# Patient Record
Sex: FEMALE | Race: WHITE
[De-identification: names, ages, dates, MRNs, and addresses within clinical notes are randomized per-mention and may not be internally consistent; named-entity substitution may affect disease eponyms.]

---

## 2020-07-30 ENCOUNTER — RX ONLY (RX ONLY)
Age: 60
End: 2020-07-30

## 2020-07-30 ENCOUNTER — DOCTOR'S OFFICE (OUTPATIENT)
Dept: URBAN - METROPOLITAN AREA CLINIC 136 | Facility: CLINIC | Age: 60
Setting detail: OPHTHALMOLOGY
End: 2020-07-30
Payer: COMMERCIAL

## 2020-07-30 DIAGNOSIS — H00.15: ICD-10-CM

## 2020-07-30 DIAGNOSIS — E11.9: ICD-10-CM

## 2020-07-30 PROCEDURE — 92002 INTRM OPH EXAM NEW PATIENT: CPT | Performed by: OPHTHALMOLOGY

## 2020-08-06 ASSESSMENT — VISUAL ACUITY
OD_BCVA: 20/30-2
OS_BCVA: 20/30-1

## 2020-12-14 ENCOUNTER — DOCTOR'S OFFICE (OUTPATIENT)
Dept: URBAN - METROPOLITAN AREA CLINIC 136 | Facility: CLINIC | Age: 60
Setting detail: OPHTHALMOLOGY
End: 2020-12-14
Payer: COMMERCIAL

## 2020-12-14 DIAGNOSIS — H52.4: ICD-10-CM

## 2020-12-14 DIAGNOSIS — H52.223: ICD-10-CM

## 2020-12-14 PROBLEM — H00.15 CHALAZION; LEFT LOWER LID: Status: RESOLVED | Noted: 2020-07-30 | Resolved: 2020-12-14

## 2020-12-14 PROBLEM — E11.9 DIABETES TYPE 2 NO RETINOPATHY: Status: ACTIVE | Noted: 2020-07-30

## 2020-12-14 PROCEDURE — 92014 COMPRE OPH EXAM EST PT 1/>: CPT | Performed by: OPTOMETRIST

## 2020-12-14 PROCEDURE — 92015 DETERMINE REFRACTIVE STATE: CPT | Performed by: OPTOMETRIST

## 2020-12-14 ASSESSMENT — REFRACTION_CURRENTRX
OD_AXIS: 105
OD_SPHERE: +1.75
OS_CYLINDER: -1.50
OS_SPHERE: +2.00
OD_OVR_VA: 20/
OS_AXIS: 105
OS_VPRISM_DIRECTION: SV
OS_OVR_VA: 20/
OD_CYLINDER: -1.25
OD_VPRISM_DIRECTION: SV

## 2020-12-14 ASSESSMENT — REFRACTION_MANIFEST
OS_VA1: 20/20
OU_VA: 20/20
OS_SPHERE: +1.75
OS_CYLINDER: -1.25
OS_ADD: +2.25
OD_VA1: 20/20
OD_SPHERE: +1.50
OD_CYLINDER: -1.25
OS_AXIS: 100
OD_ADD: +2.25
OD_AXIS: 105

## 2020-12-14 ASSESSMENT — REFRACTION_AUTOREFRACTION
OD_AXIS: 105
OS_AXIS: 095
OD_SPHERE: +2.00
OD_CYLINDER: -1.25
OS_CYLINDER: -1.25
OS_SPHERE: +2.25

## 2020-12-14 ASSESSMENT — CONFRONTATIONAL VISUAL FIELD TEST (CVF)
OD_FINDINGS: FULL
OS_FINDINGS: FULL

## 2020-12-14 ASSESSMENT — SPHEQUIV_DERIVED
OS_SPHEQUIV: 1.625
OD_SPHEQUIV: 0.875
OD_SPHEQUIV: 1.375
OS_SPHEQUIV: 1.125

## 2020-12-14 ASSESSMENT — VISUAL ACUITY
OD_BCVA: 20/20-1
OS_BCVA: 20/20-2

## 2022-04-12 ENCOUNTER — TELEPHONE (OUTPATIENT)
Dept: PAIN MEDICINE | Facility: MEDICAL CENTER | Age: 62
End: 2022-04-12

## 2022-06-06 ENCOUNTER — APPOINTMENT (OUTPATIENT)
Dept: RADIOLOGY | Facility: MEDICAL CENTER | Age: 62
End: 2022-06-06
Payer: COMMERCIAL

## 2022-06-06 DIAGNOSIS — M54.42 ACUTE BACK PAIN WITH SCIATICA, LEFT: ICD-10-CM

## 2022-06-06 DIAGNOSIS — K62.89 CHRONIC IDIOPATHIC ANAL PAIN: ICD-10-CM

## 2022-06-06 DIAGNOSIS — M54.41 ACUTE BACK PAIN WITH SCIATICA, RIGHT: ICD-10-CM

## 2022-06-06 DIAGNOSIS — G89.29 CHRONIC IDIOPATHIC ANAL PAIN: ICD-10-CM

## 2022-06-06 PROCEDURE — 72114 X-RAY EXAM L-S SPINE BENDING: CPT

## 2022-06-28 ENCOUNTER — HOSPITAL ENCOUNTER (OUTPATIENT)
Dept: MAMMOGRAPHY | Facility: MEDICAL CENTER | Age: 62
Discharge: HOME | End: 2022-06-28
Payer: COMMERCIAL

## 2022-06-28 VITALS — WEIGHT: 200 LBS | HEIGHT: 62 IN | BODY MASS INDEX: 36.8 KG/M2

## 2022-06-28 DIAGNOSIS — Z12.31 ENCOUNTER FOR SCREENING MAMMOGRAM FOR MALIGNANT NEOPLASM OF BREAST: ICD-10-CM

## 2022-06-28 PROCEDURE — 77067 SCR MAMMO BI INCL CAD: CPT

## 2022-06-28 PROCEDURE — 77063 BREAST TOMOSYNTHESIS BI: CPT

## 2022-07-07 ENCOUNTER — APPOINTMENT (OUTPATIENT)
Dept: RADIOLOGY | Facility: MEDICAL CENTER | Age: 62
End: 2022-07-07
Payer: COMMERCIAL

## 2022-07-07 ENCOUNTER — TELEPHONE (OUTPATIENT)
Dept: PAIN MEDICINE | Facility: MEDICAL CENTER | Age: 62
End: 2022-07-07

## 2022-07-07 ENCOUNTER — OFFICE VISIT (OUTPATIENT)
Dept: URGENT CARE | Facility: MEDICAL CENTER | Age: 62
End: 2022-07-07
Payer: COMMERCIAL

## 2022-07-07 VITALS
DIASTOLIC BLOOD PRESSURE: 90 MMHG | HEIGHT: 62 IN | SYSTOLIC BLOOD PRESSURE: 130 MMHG | RESPIRATION RATE: 20 BRPM | TEMPERATURE: 98.6 F | WEIGHT: 200 LBS | BODY MASS INDEX: 36.8 KG/M2 | HEART RATE: 85 BPM | OXYGEN SATURATION: 96 %

## 2022-07-07 DIAGNOSIS — J18.9 PNEUMONIA OF LEFT LOWER LOBE DUE TO INFECTIOUS ORGANISM: Primary | ICD-10-CM

## 2022-07-07 DIAGNOSIS — J20.9 ACUTE BRONCHITIS, UNSPECIFIED ORGANISM: ICD-10-CM

## 2022-07-07 PROCEDURE — 99213 OFFICE O/P EST LOW 20 MIN: CPT | Performed by: PHYSICIAN ASSISTANT

## 2022-07-07 PROCEDURE — 71046 X-RAY EXAM CHEST 2 VIEWS: CPT

## 2022-07-07 RX ORDER — SIMVASTATIN 20 MG
1 TABLET ORAL EVERY EVENING
COMMUNITY
Start: 2022-05-06

## 2022-07-07 RX ORDER — AZITHROMYCIN 250 MG/1
TABLET, FILM COATED ORAL
Qty: 6 TABLET | Refills: 0 | Status: SHIPPED | OUTPATIENT
Start: 2022-07-07 | End: 2022-07-11

## 2022-07-07 RX ORDER — VALACYCLOVIR HYDROCHLORIDE 1 G/1
TABLET, FILM COATED ORAL
COMMUNITY

## 2022-07-07 RX ORDER — LISINOPRIL 5 MG/1
5 TABLET ORAL DAILY
COMMUNITY
Start: 2021-12-29 | End: 2022-12-29

## 2022-07-07 RX ORDER — LORATADINE 10 MG/1
TABLET ORAL
COMMUNITY

## 2022-07-07 RX ORDER — ASPIRIN 81 MG/1
81 TABLET ORAL DAILY
COMMUNITY

## 2022-07-07 RX ORDER — BENZONATATE 200 MG/1
200 CAPSULE ORAL 3 TIMES DAILY PRN
Qty: 20 CAPSULE | Refills: 0 | Status: SHIPPED | OUTPATIENT
Start: 2022-07-07 | End: 2022-07-21

## 2022-07-07 RX ORDER — PREDNISONE 10 MG/1
TABLET ORAL
Qty: 17 TABLET | Refills: 0 | Status: SHIPPED | OUTPATIENT
Start: 2022-07-07

## 2022-07-07 RX ORDER — TRAMADOL HYDROCHLORIDE 50 MG/1
50 TABLET ORAL EVERY 8 HOURS PRN
COMMUNITY
Start: 2022-06-15 | End: 2023-06-15

## 2022-07-07 RX ORDER — GLIMEPIRIDE 2 MG/1
1 TABLET ORAL DAILY
COMMUNITY
Start: 2022-04-29

## 2022-07-07 NOTE — PATIENT INSTRUCTIONS
Prednisone as prescribed  Take 1st in the morning  Monitor glucose closely and limit carb intake  Do not use NSAIDs while taking prednisone  Tylenol  Tessalon Perles as needed for cough  Take antibiotic as prescribed  Complete full dose even symptoms began to improve  Over-the-counter cough suppressant  Drink plenty of fluids  Take deep breaths  Be sure to follow-up with family doctor in 3-5 days  Go to ER if symptoms become severe

## 2022-07-07 NOTE — LETTER
July 7, 2022     Patient: Radha Elizabeth   YOB: 1960   Date of Visit: 7/7/2022       To Whom It May Concern: It is my medical opinion that Radha Elizabeth may return to work on 7/13/2022             Sincerely,        Nohemi Huber PA-C

## 2022-07-07 NOTE — PROGRESS NOTES
[facility]        NAME: Tristan Brady is a 65 y o  female  : 1960    MRN: [id number]  DATE: 2022  TIME: 12:34 PM    Assessment and Plan   Pneumonia of left lower lobe due to infectious organism [J18 9]  1  Pneumonia of left lower lobe due to infectious organism  XR chest pa & lateral    azithromycin (ZITHROMAX) 250 mg tablet    benzonatate (TESSALON) 200 MG capsule    predniSONE 10 mg tablet         Patient Instructions   Prednisone as prescribed  Take 1st in the morning  Monitor glucose closely and limit carb intake  Do not use NSAIDs while taking prednisone  Tylenol  Tessalon Perles as needed for cough  Take antibiotic as prescribed  Complete full dose even symptoms began to improve  Over-the-counter cough suppressant  Drink plenty of fluids  Take deep breaths    Follow up with PCP in 3-5 days  Proceed to  ER if symptoms worsen  Chief Complaint     Chief Complaint   Patient presents with    Cough     Pt C/O of a cough that is making it hard to catch her breath since 22, completed home covid test negative  Pt just finished a round of steroid treatment  Was seen by a physician recently and informed lungs sounded clear  History of Present Illness       Pt is a 65year old female with sig PMHx of DM presents to the office c/o cough x 1 week  Reports SOB only with coughing fits  She denies fever, chills, sore throat, congestion, CP, nausea, vomiting, or abdominal pain  Denies LE swelling or calf pain  She has taken multiple COVID-19 tests, last one today, which were all negative  She took a medrol dose pack with no relief of symptoms  BG have around 168-190  Review of Systems   Review of Systems   Constitutional: Negative for chills and fever  HENT: Negative for congestion, postnasal drip, rhinorrhea and sore throat  Respiratory: Positive for cough and shortness of breath  Cardiovascular: Negative for chest pain and palpitations     Gastrointestinal: Negative for abdominal pain, diarrhea, nausea and vomiting  Musculoskeletal: Negative for myalgias  Neurological: Negative for dizziness, light-headedness and headaches           Current Medications       Current Outpatient Medications:     azithromycin (ZITHROMAX) 250 mg tablet, Take 2 tablets today then 1 tablet daily x 4 days, Disp: 6 tablet, Rfl: 0    benzonatate (TESSALON) 200 MG capsule, Take 1 capsule (200 mg total) by mouth 3 (three) times a day as needed for cough for up to 14 days, Disp: 20 capsule, Rfl: 0    glimepiride (AMARYL) 2 mg tablet, Take 1 tablet by mouth daily, Disp: , Rfl:     lisinopril (ZESTRIL) 5 mg tablet, Take 5 mg by mouth daily, Disp: , Rfl:     predniSONE 10 mg tablet, Take 4 on day 1, take 4 on day 2, take 3 on day 3, take 3 on day 4, take 2 on day 5, take 1 on day 6 , Disp: 17 tablet, Rfl: 0    simvastatin (ZOCOR) 20 mg tablet, Take 1 tablet by mouth every evening, Disp: , Rfl:     traMADol (ULTRAM) 50 mg tablet, Take 50 mg by mouth every 8 (eight) hours as needed, Disp: , Rfl:     aspirin (ECOTRIN LOW STRENGTH) 81 mg EC tablet, Take 81 mg by mouth daily, Disp: , Rfl:     famotidine (PEPCID) 20 mg/2 mL SOLN, , Disp: , Rfl:     loratadine (CLARITIN) 10 mg tablet, , Disp: , Rfl:     valACYclovir (VALTREX) 1,000 mg tablet, , Disp: , Rfl:     Current Allergies     Allergies as of 07/07/2022 - Reviewed 07/07/2022   Allergen Reaction Noted    Acetazolamide Hives 10/12/2020    Red dye - food allergy Other (See Comments) 07/29/2014    Sulfa antibiotics Other (See Comments) 12/04/2001            The following portions of the patient's history were reviewed and updated as appropriate: allergies, current medications, past family history, past medical history, past social history, past surgical history and problem list      Past Medical History:   Diagnosis Date    Chronic back pain     Type 2 diabetes mellitus ([address] )        Past Surgical History:   Procedure Laterality Date    BREAST BIOPSY Left     benign       Family History   Problem Relation Age of Onset    Breast cancer Mother 72         Medications have been verified  Objective   /90   Pulse 85   Temp 98 6 °F (37 °C)   Resp 20   Ht 5' 2" (1 575 m)   Wt 90 7 kg (200 lb)   SpO2 96%   BMI 36 58 kg/m²   No LMP recorded  Patient is postmenopausal        Physical Exam     Physical Exam  Vitals and nursing note reviewed  Constitutional:       Appearance: Normal appearance  She is well-developed  HENT:      Head: Normocephalic and atraumatic  Right Ear: Tympanic membrane, ear canal and external ear normal       Left Ear: Tympanic membrane, ear canal and external ear normal       Nose: Nose normal       Mouth/Throat:      Pharynx: Uvula midline  Eyes:      General: Lids are normal       Conjunctiva/sclera: Conjunctivae normal       Pupils: Pupils are equal, round, and reactive to light  Cardiovascular:      Rate and Rhythm: Normal rate and regular rhythm  Pulses: Normal pulses  Heart sounds: Normal heart sounds  No murmur heard  No friction rub  No gallop  Pulmonary:      Effort: Pulmonary effort is normal       Breath sounds: Normal breath sounds  No wheezing, rhonchi or rales  Abdominal:      General: Bowel sounds are normal       Palpations: Abdomen is soft  Tenderness: There is no abdominal tenderness  Musculoskeletal:         General: Normal range of motion  Cervical back: Neck supple  Lymphadenopathy:      Cervical: No cervical adenopathy  Skin:     General: Skin is warm and dry  Capillary Refill: Capillary refill takes less than 2 seconds  Comments: No LE swelling   Neurological:      Mental Status: She is alert  Chest x-ray:  Slight faint consolidation/infiltrate in the left lower lobe  Radiology interpretation pending

## 2022-07-07 NOTE — TELEPHONE ENCOUNTER
Patients  calling to reschedule appt his wife has Pneumonia, I rescheduled for next available 9/22 he did not want his wife to wait that long, they are coming to consult to then get a referral to surgeon? Is there any way physician can make an exception for his wife? I offered to send message I made aware specialist booked out       Please advise    Thank you

## 2022-07-08 NOTE — TELEPHONE ENCOUNTER
I called patient back and made aware that we do not have any sooner availability for consult       Thank you

## 2022-08-03 ENCOUNTER — APPOINTMENT (OUTPATIENT)
Dept: LAB | Facility: MEDICAL CENTER | Age: 62
End: 2022-08-03
Payer: COMMERCIAL

## 2022-08-03 DIAGNOSIS — E11.29 TYPE 2 DIABETES MELLITUS WITH OTHER KIDNEY COMPLICATION, UNSPECIFIED WHETHER LONG TERM INSULIN USE (HCC): ICD-10-CM

## 2022-08-03 DIAGNOSIS — M54.42 CHRONIC LOW BACK PAIN WITH BILATERAL SCIATICA, UNSPECIFIED BACK PAIN LATERALITY: ICD-10-CM

## 2022-08-03 DIAGNOSIS — M48.062 LUMBAR STENOSIS WITH NEUROGENIC CLAUDICATION: ICD-10-CM

## 2022-08-03 DIAGNOSIS — G89.29 CHRONIC LOW BACK PAIN WITH BILATERAL SCIATICA, UNSPECIFIED BACK PAIN LATERALITY: ICD-10-CM

## 2022-08-03 DIAGNOSIS — M54.41 CHRONIC LOW BACK PAIN WITH BILATERAL SCIATICA, UNSPECIFIED BACK PAIN LATERALITY: ICD-10-CM

## 2022-08-03 LAB
ALBUMIN SERPL BCP-MCNC: 3.7 G/DL (ref 3.5–5)
ALP SERPL-CCNC: 91 U/L (ref 46–116)
ALT SERPL W P-5'-P-CCNC: 47 U/L (ref 12–78)
ANION GAP SERPL CALCULATED.3IONS-SCNC: 6 MMOL/L (ref 4–13)
AST SERPL W P-5'-P-CCNC: 27 U/L (ref 5–45)
BASOPHILS # BLD AUTO: 0.03 THOUSANDS/ΜL (ref 0–0.1)
BASOPHILS NFR BLD AUTO: 0 % (ref 0–1)
BILIRUB SERPL-MCNC: 0.77 MG/DL (ref 0.2–1)
BUN SERPL-MCNC: 16 MG/DL (ref 5–25)
CALCIUM SERPL-MCNC: 10 MG/DL (ref 8.3–10.1)
CHLORIDE SERPL-SCNC: 106 MMOL/L (ref 96–108)
CHOLEST SERPL-MCNC: 118 MG/DL
CO2 SERPL-SCNC: 26 MMOL/L (ref 21–32)
CREAT SERPL-MCNC: 0.82 MG/DL (ref 0.6–1.3)
EOSINOPHIL # BLD AUTO: 0.13 THOUSAND/ΜL (ref 0–0.61)
EOSINOPHIL NFR BLD AUTO: 2 % (ref 0–6)
ERYTHROCYTE [DISTWIDTH] IN BLOOD BY AUTOMATED COUNT: 12.8 % (ref 11.6–15.1)
GFR SERPL CREATININE-BSD FRML MDRD: 77 ML/MIN/1.73SQ M
GLUCOSE P FAST SERPL-MCNC: 197 MG/DL (ref 65–99)
HCT VFR BLD AUTO: 44.2 % (ref 34.8–46.1)
HDLC SERPL-MCNC: 29 MG/DL
HGB BLD-MCNC: 13.7 G/DL (ref 11.5–15.4)
IMM GRANULOCYTES # BLD AUTO: 0.04 THOUSAND/UL (ref 0–0.2)
IMM GRANULOCYTES NFR BLD AUTO: 1 % (ref 0–2)
LDLC SERPL CALC-MCNC: 56 MG/DL (ref 0–100)
LYMPHOCYTES # BLD AUTO: 2.23 THOUSANDS/ΜL (ref 0.6–4.47)
LYMPHOCYTES NFR BLD AUTO: 28 % (ref 14–44)
MCH RBC QN AUTO: 27.8 PG (ref 26.8–34.3)
MCHC RBC AUTO-ENTMCNC: 31 G/DL (ref 31.4–37.4)
MCV RBC AUTO: 90 FL (ref 82–98)
MONOCYTES # BLD AUTO: 0.59 THOUSAND/ΜL (ref 0.17–1.22)
MONOCYTES NFR BLD AUTO: 7 % (ref 4–12)
NEUTROPHILS # BLD AUTO: 4.91 THOUSANDS/ΜL (ref 1.85–7.62)
NEUTS SEG NFR BLD AUTO: 62 % (ref 43–75)
NONHDLC SERPL-MCNC: 89 MG/DL
NRBC BLD AUTO-RTO: 0 /100 WBCS
PLATELET # BLD AUTO: 333 THOUSANDS/UL (ref 149–390)
PMV BLD AUTO: 10 FL (ref 8.9–12.7)
POTASSIUM SERPL-SCNC: 4.4 MMOL/L (ref 3.5–5.3)
PROT SERPL-MCNC: 7 G/DL (ref 6.4–8.4)
RBC # BLD AUTO: 4.92 MILLION/UL (ref 3.81–5.12)
SODIUM SERPL-SCNC: 138 MMOL/L (ref 135–147)
TRIGL SERPL-MCNC: 165 MG/DL
TSH SERPL DL<=0.05 MIU/L-ACNC: 2.03 UIU/ML (ref 0.45–4.5)
WBC # BLD AUTO: 7.93 THOUSAND/UL (ref 4.31–10.16)

## 2022-08-03 PROCEDURE — 80061 LIPID PANEL: CPT

## 2022-08-03 PROCEDURE — 85025 COMPLETE CBC W/AUTO DIFF WBC: CPT

## 2022-08-03 PROCEDURE — 84443 ASSAY THYROID STIM HORMONE: CPT

## 2022-08-03 PROCEDURE — 80053 COMPREHEN METABOLIC PANEL: CPT

## 2022-08-03 PROCEDURE — 36415 COLL VENOUS BLD VENIPUNCTURE: CPT

## 2022-08-03 PROCEDURE — 83036 HEMOGLOBIN GLYCOSYLATED A1C: CPT

## 2022-08-04 LAB
EST. AVERAGE GLUCOSE BLD GHB EST-MCNC: 214 MG/DL
HBA1C MFR BLD: 9.1 %

## 2022-09-22 ENCOUNTER — CONSULT (OUTPATIENT)
Dept: PAIN MEDICINE | Facility: MEDICAL CENTER | Age: 62
End: 2022-09-22
Payer: COMMERCIAL

## 2022-09-22 VITALS
DIASTOLIC BLOOD PRESSURE: 84 MMHG | HEART RATE: 78 BPM | OXYGEN SATURATION: 96 % | BODY MASS INDEX: 35.7 KG/M2 | HEIGHT: 62 IN | SYSTOLIC BLOOD PRESSURE: 136 MMHG | WEIGHT: 194 LBS

## 2022-09-22 DIAGNOSIS — Z98.890 STATUS POST LUMBAR LAMINECTOMY: ICD-10-CM

## 2022-09-22 DIAGNOSIS — M54.50 CHRONIC BILATERAL LOW BACK PAIN WITHOUT SCIATICA: Primary | ICD-10-CM

## 2022-09-22 DIAGNOSIS — G89.29 CHRONIC BILATERAL LOW BACK PAIN WITHOUT SCIATICA: Primary | ICD-10-CM

## 2022-09-22 PROCEDURE — 99244 OFF/OP CNSLTJ NEW/EST MOD 40: CPT | Performed by: PHYSICAL MEDICINE & REHABILITATION

## 2022-09-22 NOTE — PROGRESS NOTES
Assessment  1  Chronic bilateral low back pain without sciatica    2  Status post lumbar laminectomy        Plan  1  Patient will continue with gradual rehab exercises as instructed from Dr Rufino Abreu  2  Does well with medrol dose paks for acute flairs of pain, understands not to do this more than 2 to 3 times per year  3  Follow up as needed if pain worsens    My impressions and treatment recommendations were discussed in detail with the patient who verbalized understanding and had no further questions  Discharge instructions were provided  I personally saw and examined the patient and I agree with the above discussed plan of care  No orders of the defined types were placed in this encounter  No orders of the defined types were placed in this encounter  History of Present Illness    Cali Pedroza is a 65 y o  female seen in consultation at the request of Dr Andria Reese as a transition of care as he has left the area  The patient has been experiencing pain for about 12 years and has had an ongoing treatment relationship with Dr Andria Reese  She has done well over the past with Medrol Dosepaks and occasional interventional procedures including epidural steroid injection  She also did well with acupuncture  Her pain had been worsening and ended up seeing Dr Rufino Abreu for surgical consultation and had laminectomy and diskectomy August 22, 2002  She is rehabbing from that but continues with pain especially when walking long distances  Currently she is not experiencing any significant pain in wishes to simply stab wish care here  Last epidural steroid injections or in November of 2021 without much relief  This is what prompted surgery  Social history negative for tobacco marijuana and alcohol use  Currently using acetaminophen and Aleve for pain relief and these do provide some mild relief    Not currently using neuropathic pain medications but has tried gabapentin in the past     I have personally reviewed and/or updated the patient's past medical history, past surgical history, family history, social history, current medications, allergies, and vital signs today  Review of Systems   Constitutional: Negative for fever and unexpected weight change  HENT: Negative for trouble swallowing  Eyes: Negative for visual disturbance  Respiratory: Negative for shortness of breath and wheezing  Cardiovascular: Negative for chest pain and palpitations  Gastrointestinal: Negative for constipation, diarrhea, nausea and vomiting  Endocrine: Negative for cold intolerance, heat intolerance and polydipsia  Genitourinary: Negative for difficulty urinating and frequency  Musculoskeletal: Negative for arthralgias, gait problem, joint swelling and myalgias  Skin: Negative for rash  Neurological: Negative for dizziness, seizures, syncope, weakness and headaches  Hematological: Does not bruise/bleed easily  Psychiatric/Behavioral: Negative for dysphoric mood  All other systems reviewed and are negative  There is no problem list on file for this patient  Past Medical History:   Diagnosis Date    Chronic back pain     Hyperlipidemia     Hypertension     Type 2 diabetes mellitus (HCC)        Past Surgical History:   Procedure Laterality Date    BREAST BIOPSY Left     benign       Family History   Problem Relation Age of Onset    Breast cancer Mother 72    No Known Problems Father        Social History     Occupational History    Not on file   Tobacco Use    Smoking status: Never Smoker    Smokeless tobacco: Never Used   Vaping Use    Vaping Use: Never used   Substance and Sexual Activity    Alcohol use:  Yes    Drug use: Not Currently    Sexual activity: Not Currently       Current Outpatient Medications on File Prior to Visit   Medication Sig    aspirin (ECOTRIN LOW STRENGTH) 81 mg EC tablet Take 81 mg by mouth daily    famotidine (PEPCID) 20 mg/2 mL SOLN     glimepiride (AMARYL) 2 mg tablet Take 1 tablet by mouth daily    lisinopril (ZESTRIL) 5 mg tablet Take 5 mg by mouth daily    loratadine (CLARITIN) 10 mg tablet     simvastatin (ZOCOR) 20 mg tablet Take 1 tablet by mouth every evening    traMADol (ULTRAM) 50 mg tablet Take 50 mg by mouth every 8 (eight) hours as needed    valACYclovir (VALTREX) 1,000 mg tablet     predniSONE 10 mg tablet Take 4 on day 1, take 4 on day 2, take 3 on day 3, take 3 on day 4, take 2 on day 5, take 1 on day 6  No current facility-administered medications on file prior to visit  Allergies   Allergen Reactions    Acetazolamide Hives    Red Dye - Food Allergy Other (See Comments)     unknown    Sulfa Antibiotics Other (See Comments)       Physical Exam    /84   Pulse 78   Ht 5' 2" (1 575 m)   Wt 88 kg (194 lb)   SpO2 96%   BMI 35 48 kg/m²     Constitutional: normal, well developed, well nourished, alert, in no distress and non-toxic and no overt pain behavior    Eyes: anicteric  HEENT: grossly intact  Neck:  symmetric, trachea midline and no masses   Pulmonary:even and unlabored  Cardiovascular:No edema or pitting edema present  Psychiatric:Mood and affect appropriate  Neurologic:Cranial Nerves II-XII grossly intact, bilateral lower extremity muscle stretch reflexes are absent at the knees and ankles, lower  Musculoskeletal:normal    Imaging

## 2022-11-22 ENCOUNTER — APPOINTMENT (OUTPATIENT)
Dept: LAB | Facility: MEDICAL CENTER | Age: 62
End: 2022-11-22

## 2022-11-22 DIAGNOSIS — E11.29 CONTROLLED TYPE 2 DIABETES MELLITUS WITH MICROALBUMINURIA, WITHOUT LONG-TERM CURRENT USE OF INSULIN (HCC): ICD-10-CM

## 2022-11-22 DIAGNOSIS — R80.9 CONTROLLED TYPE 2 DIABETES MELLITUS WITH MICROALBUMINURIA, WITHOUT LONG-TERM CURRENT USE OF INSULIN (HCC): ICD-10-CM

## 2022-11-22 LAB
ALBUMIN SERPL BCP-MCNC: 3.5 G/DL (ref 3.5–5)
ALP SERPL-CCNC: 95 U/L (ref 46–116)
ALT SERPL W P-5'-P-CCNC: 37 U/L (ref 12–78)
ANION GAP SERPL CALCULATED.3IONS-SCNC: 7 MMOL/L (ref 4–13)
AST SERPL W P-5'-P-CCNC: 20 U/L (ref 5–45)
BILIRUB SERPL-MCNC: 0.57 MG/DL (ref 0.2–1)
BUN SERPL-MCNC: 15 MG/DL (ref 5–25)
CALCIUM SERPL-MCNC: 9.5 MG/DL (ref 8.3–10.1)
CHLORIDE SERPL-SCNC: 105 MMOL/L (ref 96–108)
CO2 SERPL-SCNC: 26 MMOL/L (ref 21–32)
CREAT SERPL-MCNC: 0.72 MG/DL (ref 0.6–1.3)
EST. AVERAGE GLUCOSE BLD GHB EST-MCNC: 160 MG/DL
GFR SERPL CREATININE-BSD FRML MDRD: 90 ML/MIN/1.73SQ M
GLUCOSE P FAST SERPL-MCNC: 182 MG/DL (ref 65–99)
HBA1C MFR BLD: 7.2 %
POTASSIUM SERPL-SCNC: 4.1 MMOL/L (ref 3.5–5.3)
PROT SERPL-MCNC: 7.1 G/DL (ref 6.4–8.4)
SODIUM SERPL-SCNC: 138 MMOL/L (ref 135–147)

## 2023-03-13 ENCOUNTER — TELEPHONE (OUTPATIENT)
Dept: PAIN MEDICINE | Facility: MEDICAL CENTER | Age: 63
End: 2023-03-13

## 2023-04-27 ENCOUNTER — OFFICE VISIT (OUTPATIENT)
Dept: PAIN MEDICINE | Facility: MEDICAL CENTER | Age: 63
End: 2023-04-27

## 2023-04-27 VITALS
HEART RATE: 67 BPM | DIASTOLIC BLOOD PRESSURE: 84 MMHG | OXYGEN SATURATION: 96 % | BODY MASS INDEX: 37.94 KG/M2 | SYSTOLIC BLOOD PRESSURE: 143 MMHG | WEIGHT: 206.2 LBS | HEIGHT: 62 IN

## 2023-04-27 DIAGNOSIS — M96.1 POSTLAMINECTOMY SYNDROME OF LUMBAR REGION: Primary | ICD-10-CM

## 2023-04-27 DIAGNOSIS — M54.16 LUMBAR RADICULITIS: ICD-10-CM

## 2023-04-27 RX ORDER — PREGABALIN 100 MG/1
100 CAPSULE ORAL 2 TIMES DAILY
Qty: 60 CAPSULE | Refills: 0 | Status: SHIPPED | OUTPATIENT
Start: 2023-04-27 | End: 2023-05-27

## 2023-04-27 RX ORDER — PREGABALIN 75 MG/1
75 CAPSULE ORAL 3 TIMES DAILY
COMMUNITY
End: 2023-04-27 | Stop reason: SDUPTHER

## 2023-04-27 RX ORDER — DEXTROMETHORPHAN HYDROBROMIDE AND PROMETHAZINE HYDROCHLORIDE 15; 6.25 MG/5ML; MG/5ML
5 SYRUP ORAL 4 TIMES DAILY PRN
COMMUNITY
Start: 2022-12-09

## 2023-04-27 NOTE — PROGRESS NOTES
Assessment:  1  Postlaminectomy syndrome of lumbar region    2  Lumbar radiculitis        Plan:  1  We'll schedule patient for left L4-5 transforaminal epidural steroid injection  This may need to be repeated  Complete risks and benefits including bleeding, infection, tissue reaction, allergic reaction were discussed  Verbal consent obtained  #2 if this fails to provide relief would recommend obtaining updated MRI of the lumbar spine with and without contrast   This could be ordered by our team or by Dr Roberta Villarreal  3   Increase Lyrica to 100 mg twice daily  4   Patient may also be a candidate for a spinal cord stimulator trial however we will exhaust conservative care before considering this option  My impressions and treatment recommendations were discussed in detail with the patient who verbalized understanding and had no further questions  Discharge instructions were provided  I personally saw and examined the patient and I agree with the above discussed plan of care  Orders Placed This Encounter   Procedures   • [place] spine and pain procedure     Standing Status:   Future     Standing Expiration Date:   4/27/2024     Order Specific Question:   Reason for Exam:     Answer:   (L) L4-5 TFESI     Order Specific Question:   Anticoagulant hold needed? Answer:   no     New Medications Ordered This Visit   Medications   • promethazine-dextromethorphan (PHENERGAN-DM) 6 25-15 mg/5 mL oral syrup     Sig: Take 5 mL by mouth 4 (four) times a day as needed   • pregabalin (Lyrica) 100 mg capsule     Sig: Take 1 capsule (100 mg total) by mouth 2 (two) times a day     Dispense:  60 capsule     Refill:  0       History of Present Illness:  Janell Stiles is a 59 y o  female who presents for a follow up office visit in regards to Back Pain  The patient returns in follow-up with continued pain in a left L4 distribution    She did see Dr Roberta Villarreal who had recommended left L4-5 transforaminal epidural steroid injection  She is currently describing pain rated as a 7/10 which is worse with walking or standing for prolonged periods of time consistent with nerve root irritation  Pain is constant while doing that activity throughout the entirety of the day characterized as burning sharp throbbing shooting and pins and needle sensation from the knee to her ankle on the left  Medications are not providing that for relief at this point  She is experiencing anywhere from mild to severe functional deficits  I have personally reviewed and/or updated the patient's past medical history, past surgical history, family history, social history, current medications, allergies, and vital signs today  Review of Systems   Constitutional: Positive for activity change  Negative for chills and fever  HENT: Negative for ear pain and sore throat  Eyes: Negative for pain and visual disturbance  Respiratory: Negative for cough and shortness of breath  Cardiovascular: Negative for chest pain and palpitations  Gastrointestinal: Negative for abdominal pain and vomiting  Genitourinary: Negative for dysuria and hematuria  Musculoskeletal: Positive for back pain, gait problem and myalgias  Negative for arthralgias  Skin: Negative for color change and rash  Neurological: Positive for weakness  Negative for seizures and syncope  All other systems reviewed and are negative  There is no problem list on file for this patient        Past Medical History:   Diagnosis Date   • Chronic back pain    • Hyperlipidemia    • Hypertension    • Type 2 diabetes mellitus (HCC)        Past Surgical History:   Procedure Laterality Date   • BREAST BIOPSY Left     benign       Family History   Problem Relation Age of Onset   • Breast cancer Mother 72   • No Known Problems Father        Social History     Occupational History   • Not on file   Tobacco Use   • Smoking status: Never   • Smokeless tobacco: Never   Vaping Use   • "Vaping Use: Never used   Substance and Sexual Activity   • Alcohol use: Yes   • Drug use: Not Currently   • Sexual activity: Not Currently       Current Outpatient Medications on File Prior to Visit   Medication Sig   • aspirin (ECOTRIN LOW STRENGTH) 81 mg EC tablet Take 81 mg by mouth daily   • famotidine (PEPCID) 20 mg/2 mL SOLN    • glimepiride (AMARYL) 2 mg tablet Take 1 tablet by mouth daily   • lisinopril (ZESTRIL) 5 mg tablet Take 10 mg by mouth daily   • loratadine (CLARITIN) 10 mg tablet    • simvastatin (ZOCOR) 20 mg tablet Take 1 tablet by mouth every evening   • traMADol (ULTRAM) 50 mg tablet Take 50 mg by mouth every 8 (eight) hours as needed   • valACYclovir (VALTREX) 1,000 mg tablet    • [DISCONTINUED] pregabalin (Lyrica) 75 mg capsule Take 75 mg by mouth 3 (three) times a day   • predniSONE 10 mg tablet Take 4 on day 1, take 4 on day 2, take 3 on day 3, take 3 on day 4, take 2 on day 5, take 1 on day 6  (Patient not taking: Reported on 4/27/2023)   • promethazine-dextromethorphan (PHENERGAN-DM) 6 25-15 mg/5 mL oral syrup Take 5 mL by mouth 4 (four) times a day as needed (Patient not taking: Reported on 4/27/2023)     No current facility-administered medications on file prior to visit  Allergies   Allergen Reactions   • Acetazolamide Hives   • Red Dye - Food Allergy Other (See Comments)     unknown   • Sulfa Antibiotics Other (See Comments)       Physical Exam:    /84   Pulse 67   Ht 5' 2\" (1 575 m)   Wt 93 5 kg (206 lb 3 2 oz)   SpO2 96%   BMI 37 71 kg/m²     Constitutional:normal, well developed, well nourished, alert, in no distress and non-toxic and no overt pain behavior    Eyes:anicteric  HEENT:grossly intact  Neck:supple, symmetric, trachea midline and no masses   Pulmonary:even and unlabored  Cardiovascular:No edema or pitting edema present  Skin:Normal without rashes or lesions and well hydrated  Psychiatric:Mood and affect appropriate  Neurologic:Cranial Nerves II-XII grossly " intact  Musculoskeletal:normal, except for pain in the back radiating down the left leg    Imaging

## 2023-05-08 ENCOUNTER — APPOINTMENT (OUTPATIENT)
Dept: LAB | Facility: MEDICAL CENTER | Age: 63
End: 2023-05-08

## 2023-05-08 DIAGNOSIS — R80.9 TYPE 2 DIABETES MELLITUS WITH MICROALBUMINURIA, WITHOUT LONG-TERM CURRENT USE OF INSULIN (HCC): ICD-10-CM

## 2023-05-08 DIAGNOSIS — E11.29 TYPE 2 DIABETES MELLITUS WITH MICROALBUMINURIA, WITHOUT LONG-TERM CURRENT USE OF INSULIN (HCC): ICD-10-CM

## 2023-05-08 DIAGNOSIS — R30.0 DYSURIA: ICD-10-CM

## 2023-05-08 LAB
ALBUMIN SERPL BCP-MCNC: 3.8 G/DL (ref 3.5–5)
ALP SERPL-CCNC: 108 U/L (ref 46–116)
ALT SERPL W P-5'-P-CCNC: 47 U/L (ref 12–78)
ANION GAP SERPL CALCULATED.3IONS-SCNC: 2 MMOL/L (ref 4–13)
AST SERPL W P-5'-P-CCNC: 29 U/L (ref 5–45)
BILIRUB SERPL-MCNC: 0.62 MG/DL (ref 0.2–1)
BUN SERPL-MCNC: 16 MG/DL (ref 5–25)
CALCIUM SERPL-MCNC: 9.6 MG/DL (ref 8.3–10.1)
CHLORIDE SERPL-SCNC: 108 MMOL/L (ref 96–108)
CO2 SERPL-SCNC: 26 MMOL/L (ref 21–32)
CREAT SERPL-MCNC: 0.71 MG/DL (ref 0.6–1.3)
EST. AVERAGE GLUCOSE BLD GHB EST-MCNC: 200 MG/DL
GFR SERPL CREATININE-BSD FRML MDRD: 91 ML/MIN/1.73SQ M
GLUCOSE P FAST SERPL-MCNC: 229 MG/DL (ref 65–99)
HBA1C MFR BLD: 8.6 %
POTASSIUM SERPL-SCNC: 4.7 MMOL/L (ref 3.5–5.3)
PROT SERPL-MCNC: 6.8 G/DL (ref 6.4–8.4)
SODIUM SERPL-SCNC: 136 MMOL/L (ref 135–147)

## 2023-05-09 LAB — BACTERIA UR CULT: NORMAL

## 2023-05-12 DIAGNOSIS — M79.2 NEUROPATHIC PAIN: Primary | ICD-10-CM

## 2023-05-12 RX ORDER — PREGABALIN 50 MG/1
100 CAPSULE ORAL 2 TIMES DAILY
Qty: 120 CAPSULE | Refills: 0 | Status: SHIPPED | OUTPATIENT
Start: 2023-05-12 | End: 2023-06-16 | Stop reason: SDUPTHER

## 2023-05-15 ENCOUNTER — TELEPHONE (OUTPATIENT)
Dept: PAIN MEDICINE | Facility: MEDICAL CENTER | Age: 63
End: 2023-05-15

## 2023-05-15 NOTE — TELEPHONE ENCOUNTER
RN answered pt's questions about upcoming procedure and pt aware of d/c instructions as well  Aware this will all be gone over agaoin the day of the procedure  Pt appreciative of call

## 2023-05-15 NOTE — TELEPHONE ENCOUNTER
Caller: patient    Doctor: Tatiana Castellano    Reason for call: would like a call back regarding her instruction before procedure    Call back#:

## 2023-05-17 ENCOUNTER — HOSPITAL ENCOUNTER (OUTPATIENT)
Dept: RADIOLOGY | Facility: MEDICAL CENTER | Age: 63
Discharge: HOME | End: 2023-05-17

## 2023-05-17 VITALS
HEART RATE: 77 BPM | SYSTOLIC BLOOD PRESSURE: 128 MMHG | RESPIRATION RATE: 18 BRPM | DIASTOLIC BLOOD PRESSURE: 80 MMHG | OXYGEN SATURATION: 95 % | TEMPERATURE: 98 F

## 2023-05-17 DIAGNOSIS — M54.16 LUMBAR RADICULITIS: ICD-10-CM

## 2023-05-17 RX ORDER — PAPAVERINE HCL 150 MG
10 CAPSULE, EXTENDED RELEASE ORAL ONCE
Status: COMPLETED | OUTPATIENT
Start: 2023-05-17 | End: 2023-05-17

## 2023-05-17 RX ADMIN — IOHEXOL 2 ML: 300 INJECTION, SOLUTION INTRAVENOUS at 14:53

## 2023-05-17 RX ADMIN — Medication 10 MG: at 14:53

## 2023-05-17 NOTE — H&P
History of Present Illness: The patient is a 59 y o  female who presents with complaints of back and left leg pain    Past Medical History:   Diagnosis Date   • Chronic back pain    • Hyperlipidemia    • Hypertension    • Type 2 diabetes mellitus (HCC)        Past Surgical History:   Procedure Laterality Date   • BREAST BIOPSY Left     benign         Current Outpatient Medications:   •  aspirin (ECOTRIN LOW STRENGTH) 81 mg EC tablet, Take 81 mg by mouth daily, Disp: , Rfl:   •  famotidine (PEPCID) 20 mg/2 mL SOLN, , Disp: , Rfl:   •  glimepiride (AMARYL) 2 mg tablet, Take 1 tablet by mouth daily, Disp: , Rfl:   •  lisinopril (ZESTRIL) 5 mg tablet, Take 10 mg by mouth daily, Disp: , Rfl:   •  loratadine (CLARITIN) 10 mg tablet, , Disp: , Rfl:   •  pregabalin (Lyrica) 100 mg capsule, Take 1 capsule (100 mg total) by mouth 2 (two) times a day, Disp: 60 capsule, Rfl: 0  •  pregabalin (LYRICA) 50 mg capsule, Take 2 capsules (100 mg total) by mouth 2 (two) times a day, Disp: 120 capsule, Rfl: 0  •  simvastatin (ZOCOR) 20 mg tablet, Take 1 tablet by mouth every evening, Disp: , Rfl:   •  traMADol (ULTRAM) 50 mg tablet, Take 50 mg by mouth every 8 (eight) hours as needed, Disp: , Rfl:   •  valACYclovir (VALTREX) 1,000 mg tablet, , Disp: , Rfl:     Allergies   Allergen Reactions   • Acetazolamide Hives   • Red Dye - Food Allergy Other (See Comments)     unknown   • Sulfa Antibiotics Other (See Comments)       Physical Exam:   Vitals:    05/17/23 1436   BP: 128/82   Pulse: 78   Resp: 18   Temp: 98 °F (36 7 °C)   SpO2: 95%     General: Awake, Alert, Oriented x 3, Mood and affect appropriate  Respiratory: Respirations even and unlabored  Cardiovascular: Peripheral pulses intact; no edema  Musculoskeletal Exam: back and left leg pain    ASA Score: 2    Patient/Chart Verification  Patient ID Verified: Verbal  Consents Confirmed: Procedural, To be obtained in the Pre-Procedure area  H&P( within 30 days) Verified:  To be obtained in the Pre-Procedure area  Allergies Reviewed: Yes  Anticoag/NSAID held?: NA  Currently on antibiotics?: No  Pregnancy denied?: NA    Assessment:   1   Lumbar radiculitis        Plan: (L) L4-5 TFESI

## 2023-05-17 NOTE — DISCHARGE INSTRUCTIONS
Epidural Steroid Injection   WHAT YOU NEED TO KNOW:   An epidural steroid injection (KAMLA) is a procedure to inject steroid medicine into the epidural space  The epidural space is between your spinal cord and vertebrae  Steroids reduce inflammation and fluid buildup in your spine that may be causing pain  You may be given pain medicine along with the steroids  ACTIVITY  Do not drive or operate machinery today  No strenuous activity today - bending, lifting, etc   You may resume normal activites starting tomorrow - start slowly and as tolerated  You may shower today, but no tub baths or hot tubs  You may have numbness for several hours from the local anesthetic  Please use caution and common sense, especially with weight-bearing activities  CARE OF THE INJECTION SITE  If you have soreness or pain, apply ice to the area today (20 minutes on/20 minutes off)  Starting tomorrow, you may use warm, moist heat or ice if needed  You may have an increase or change in your discomfort for 36-48 hours after your treatment  Apply ice and continue with any pain medication you have been prescribed  Notify the [facility] if you have any of the following: redness, drainage, swelling, headache, stiff neck or fever above 100°F     SPECIAL INSTRUCTIONS  Our office will contact you in approximately 7 days for a progress report  MEDICATIONS  Continue to take all routine medications  Our office may have instructed you to hold some medications  As no general anesthesia was used in today's procedure, you should not experience any side effects related to anesthesia  If you are diabetic, the steroids used in today's injection may temporarily increase your blood sugar levels after the first few days after your injection  Please keep a close eye on your sugars and alert the doctor who manages your diabetes if your sugars are significantly high from your baseline or you are symptomatic       If you have a problem specifically related to your procedure, please call our office at [phone]  Problems not related to your procedure should be directed to your primary care physician

## 2023-05-24 ENCOUNTER — TELEPHONE (OUTPATIENT)
Dept: RADIOLOGY | Facility: MEDICAL CENTER | Age: 63
End: 2023-05-24

## 2023-05-24 NOTE — TELEPHONE ENCOUNTER
Patient Reports        60 %     improvement post injection    Pain Level   0 now  /10      Patient would like a call back from one of the nurses regarding her pregabalin

## 2023-05-24 NOTE — TELEPHONE ENCOUNTER
--SUNILI--    S/W pt  Advised pt of the same  Pt wanted the doses clarified  CHAVA S/W STEPHANIE  He stated to take 50 mg in am and 100 mg in pm   Advised pt  Pt verbalized understanding

## 2023-05-24 NOTE — TELEPHONE ENCOUNTER
S/W pt  Pt stated the lyrica is making her very drowsy while driving during the day  It has been at least 1 week like this  She is taking 100 mg BID  Pt asking if she should only take it at night or take a lower dose in the am?  Please advise

## 2023-05-24 NOTE — TELEPHONE ENCOUNTER
Kalee Payton PT    Doctor: Dr Arabella Chan    Reason for call: Medication     Call back#: [phone]

## 2023-06-14 ENCOUNTER — OFFICE VISIT (OUTPATIENT)
Dept: PAIN MEDICINE | Facility: MEDICAL CENTER | Age: 63
End: 2023-06-14

## 2023-06-14 VITALS
BODY MASS INDEX: 36.99 KG/M2 | HEIGHT: 62 IN | DIASTOLIC BLOOD PRESSURE: 83 MMHG | WEIGHT: 201 LBS | HEART RATE: 80 BPM | SYSTOLIC BLOOD PRESSURE: 136 MMHG

## 2023-06-14 DIAGNOSIS — M54.16 LUMBAR RADICULOPATHY: ICD-10-CM

## 2023-06-14 DIAGNOSIS — M47.816 LUMBAR SPONDYLOSIS: ICD-10-CM

## 2023-06-14 DIAGNOSIS — M96.1 LUMBAR POSTLAMINECTOMY SYNDROME: Primary | ICD-10-CM

## 2023-06-14 DIAGNOSIS — M48.061 SPINAL STENOSIS OF LUMBAR REGION WITHOUT NEUROGENIC CLAUDICATION: ICD-10-CM

## 2023-06-14 PROCEDURE — 99214 OFFICE O/P EST MOD 30 MIN: CPT | Performed by: PHYSICIAN ASSISTANT

## 2023-06-14 RX ORDER — METHYLPREDNISOLONE 4 MG/1
TABLET ORAL
Qty: 1 EACH | Refills: 0 | Status: SHIPPED | OUTPATIENT
Start: 2023-06-14

## 2023-06-14 NOTE — PATIENT INSTRUCTIONS
TPregabalin (By mouth)   Pregabalin (pre-GA-ba-franc)  Treats nerve and muscle pain, including fibromyalgia  Also treats partial-onset seizures  Brand Name(s): Lyrica, Lyrica CR   There may be other brand names for this medicine  When This Medicine Should Not Be Used: This medicine is not right for everyone  Do not use it if you had an allergic reaction to pregabalin  How to Use This Medicine:   Capsule, Liquid, Long Acting Tablet  Take your medicine as directed  Your dose may need to be changed several times to find what works best for you  Extended-release tablet: Swallow the extended-release tablet whole  Do not crush, break, or chew it  Take it after an evening meal   Oral liquid: Measure the oral liquid medicine with a marked measuring spoon, oral syringe, or medicine cup  This medicine should come with a Medication Guide  Ask your pharmacist for a copy if you do not have one  Missed dose: Take a dose as soon as you remember  If it is almost time for your next dose, wait until then and take a regular dose  Do not take extra medicine to make up for a missed dose  If you miss a dose of the extended-release tablet after your evening meal, take it before bedtime after a snack  If you miss the dose before bedtime, take it after your morning meal  If you do not take the dose the following morning, then take the next dose at your regular time after your evening meal  Do not take 2 doses at the same time  Store the medicine in a closed container at room temperature, away from heat, moisture, and direct light  Drugs and Foods to Avoid:   Ask your doctor or pharmacist before using any other medicine, including over-the-counter medicines, vitamins, and herbal products  Some medicines can affect how pregabalin works   Tell your doctor if you are using any of the following:   ACE inhibitor (including benazepril, enalapril, lisinopril, quinapril, ramipril)  Oral diabetes medicine (including metformin, pioglitazone, rosiglitazone)  Do not drink alcohol while you are using this medicine  Tell your doctor if you use anything else that makes you sleepy  Some examples are allergy medicine, narcotic pain medicine, and alcohol  Tell your doctor if you are also using oxycodone, lorazepam, or zolpidem  Warnings While Using This Medicine:   Tell your doctor if you are pregnant or breastfeeding, or if you have kidney disease, heart failure, heart rhythm problems, lung or breathing problems, a bleeding disorder, diabetes, sores or skin problems, or a low blood platelet count  Tell your doctor if you have a history of angioedema (severe swelling), alcohol or drug abuse, depression, or other mood problems  This medicine may cause the following problems:   Angioedema (severe swelling), which may be life-threatening  Changes in mood or behavior, including suicidal thoughts or behavior  Respiratory depression (serious breathing problem that can be life-threatening), when used with narcotic pain medicines  Peripheral edema (swelling of your hands, ankles, feet, or lower legs)  Increased risk for cancer and bleeding  Serious muscle problems  Heart rhythm changes  This medicine may make you dizzy or drowsy  It may also cause blurry or double vision  Do not drive or do anything else that could be dangerous until you know how this medicine affects you  Do not stop using this medicine suddenly  Your doctor will need to slowly decrease your dose before you stop it completely  Your doctor will do lab tests at regular visits to check on the effects of this medicine  Keep all appointments  Keep all medicine out of the reach of children  Never share your medicine with anyone  Possible Side Effects While Using This Medicine:   Call your doctor right away if you notice any of these side effects:   Allergic reaction: Itching or hives, swelling in your face or hands, swelling or tingling in your mouth or throat, chest tightness, trouble breathing  Blistering, peeling, red skin rash  Blue lips, fingernails, or skin, trouble breathing, chest pain  Blurry or double vision  Fever, chills, cough, sore throat, body aches  Muscle pain, tenderness, or weakness, general feeling of illness  Rapid weight gain, swelling in your hands, ankles, or feet  Severe dizziness or drowsiness  Sudden mood changes, unusual moods or behavior, including extreme happiness or depression, thoughts or attempts of killing oneself  Swelling in your throat, head, or neck  Uneven heartbeat  Unusual bleeding, bruising, or weakness  If you notice these less serious side effects, talk with your doctor:   Confusion, trouble concentrating  Constipation  Dry mouth  If you notice other side effects that you think are caused by this medicine, tell your doctor  Call your doctor for medical advice about side effects  You may report side effects to FDA at 5-325-FDA-6969  © Copyright [employer] 2022 Information is for End User's use only and may not be sold, redistributed or otherwise used for commercial purposes  The above information is an  only  It is not intended as medical advice for individual conditions or treatments  Talk to your doctor, nurse or pharmacist before following any medical regimen to see if it is safe and effective for you

## 2023-06-14 NOTE — PROGRESS NOTES
Assessment:  1  Lumbar postlaminectomy syndrome    2  Lumbar radiculopathy    3  Spinal stenosis of lumbar region without neurogenic claudication    4  Lumbar spondylosis        Plan:  While the patient was in the office today, I did have a thorough conversation regarding their chronic pain syndrome, medication management, and treatment plan options  After discussing options, I have recommended that she increase the nighttime dose of pregabalin to 150 mg and continue the daytime dose at 50 mg  She is unable to tolerate higher doses during the day due to sedation  Hopefully this will alleviate the lower extremity radicular/neuropathic pain she describes  She is able to report about 20% reduction in back and leg pain following the L4-5 transforaminal epidural steroid injection  She was made aware that injection therapy can be repeated in the future if indicated  I have provided her with a Medrol Dosepak to be taken as directed to calm down any acute exacerbations that she may experience; this has been very beneficial in the past     We briefly discussed spinal cord stimulator trial with the patient however this is something she would like to consider only as a last resort  Consider trigger point injections to alleviate the myofascial component of her back pain  Consider new MRI  Otherwise I will ask her to follow-up with me in 2 to 3 months or sooner if needed if the pain changes or worsens  My impressions and treatment recommendations were discussed in detail with the patient who verbalized understanding and had no further questions  Discharge instructions were provided  I personally saw and examined the patient and I agree with the above discussed plan of care  No orders of the defined types were placed in this encounter      New Medications Ordered This Visit   Medications   • methylPREDNISolone 4 MG tablet therapy pack     Sig: Use as directed on package     Dispense:  1 each     Refill:  0 History of Present Illness:  Kadi Coker is a 59 y o  female who presents for a follow up office visit in regards to back pain  The patient’s current symptoms include chronic back pain that she presently rates a 4 out of 10 on the pain scale describes it as an occasional burning, sharp, cramping, pressure-like and shooting pain that radiates into the left lower extremity primarily from the knee down anteriorly  She underwent a left L4-5 transforaminal epidural steroid injection and reported about 20% reduction in pain  She admits that she is able to walk farther distances without pain than she was prior to the injection  She is taking 50 mg of Lyrica in the morning and 100 mg in the evening with some benefit; she was unable to tolerate higher doses during the day due to sedation  She expresses interest in holding off on injection therapy at this point in time and giving the Lyrica a little bit more time to help  I have personally reviewed and/or updated the patient's past medical history, past surgical history, family history, social history, current medications, allergies, and vital signs today  Review of Systems   Respiratory: Negative for shortness of breath  Cardiovascular: Negative for chest pain  Gastrointestinal: Negative for constipation, diarrhea, nausea and vomiting  Musculoskeletal: Positive for back pain and gait problem  Negative for arthralgias, joint swelling and myalgias  Skin: Negative for rash  Neurological: Negative for dizziness, seizures and weakness  All other systems reviewed and are negative        Patient Active Problem List   Diagnosis   • Lumbar radiculitis       Past Medical History:   Diagnosis Date   • Chronic back pain    • Hyperlipidemia    • Hypertension    • Type 2 diabetes mellitus [facility])        Past Surgical History:   Procedure Laterality Date   • BREAST BIOPSY Left     benign   • LAMINECTOMY  August 22, 2023       Family History   Problem Relation "Age of Onset   • Breast cancer Mother 72   • Cancer Mother         Breast Cancer   • Diabetes Father        Social History     Occupational History   • Not on file   Tobacco Use   • Smoking status: Never   • Smokeless tobacco: Never   Vaping Use   • Vaping Use: Never used   Substance and Sexual Activity   • Alcohol use: Not Currently   • Drug use: Not Currently     Types: Marijuana   • Sexual activity: Yes     Partners: Male     Birth control/protection: None       Current Outpatient Medications on File Prior to Visit   Medication Sig   • aspirin (ECOTRIN LOW STRENGTH) 81 mg EC tablet Take 81 mg by mouth daily   • famotidine (PEPCID) 20 mg/2 mL SOLN    • glimepiride (AMARYL) 2 mg tablet Take 1 tablet by mouth daily   • lisinopril (ZESTRIL) 5 mg tablet Take 10 mg by mouth daily   • loratadine (CLARITIN) 10 mg tablet    • pregabalin (Lyrica) 100 mg capsule Take 1 capsule (100 mg total) by mouth 2 (two) times a day   • pregabalin (LYRICA) 50 mg capsule Take 2 capsules (100 mg total) by mouth 2 (two) times a day   • simvastatin (ZOCOR) 20 mg tablet Take 1 tablet by mouth every evening   • valACYclovir (VALTREX) 1,000 mg tablet    • traMADol (ULTRAM) 50 mg tablet Take 50 mg by mouth every 8 (eight) hours as needed (Patient not taking: Reported on 6/14/2023)     No current facility-administered medications on file prior to visit  Allergies   Allergen Reactions   • Acetazolamide Hives   • Red Dye - Food Allergy Other (See Comments)     unknown   • Sulfa Antibiotics Other (See Comments)       Physical Exam:    /83   Pulse 80   Ht 5' 2\" (1 575 m)   Wt 91 2 kg (201 lb)   BMI 36 76 kg/m²     Constitutional:normal, well developed, well nourished, alert, in no distress and non-toxic and no overt pain behavior    Eyes:anicteric  HEENT:grossly intact  Neck:supple, symmetric, trachea midline and no masses   Pulmonary:even and unlabored  Cardiovascular:No edema or pitting edema present  Skin:Normal without rashes or " lesions and well hydrated  Psychiatric:Mood and affect appropriate  Neurologic:Cranial Nerves II-XII grossly intact  Musculoskeletal: Lumbar scar from prior surgery, well-healed    Tender to palpation over the bilateral paraspinal muscles of the lumbar spine    Imaging

## 2023-06-16 ENCOUNTER — TELEPHONE (OUTPATIENT)
Dept: PAIN MEDICINE | Facility: MEDICAL CENTER | Age: 63
End: 2023-06-16

## 2023-06-16 DIAGNOSIS — M54.16 LUMBAR RADICULITIS: ICD-10-CM

## 2023-06-16 DIAGNOSIS — M96.1 POSTLAMINECTOMY SYNDROME OF LUMBAR REGION: ICD-10-CM

## 2023-06-16 DIAGNOSIS — M79.2 NEUROPATHIC PAIN: ICD-10-CM

## 2023-06-16 RX ORDER — PREGABALIN 100 MG/1
100 CAPSULE ORAL
Qty: 30 CAPSULE | Refills: 2 | Status: SHIPPED | OUTPATIENT
Start: 2023-06-16 | End: 2023-07-16

## 2023-06-16 RX ORDER — PREGABALIN 50 MG/1
50 CAPSULE ORAL 2 TIMES DAILY
Qty: 60 CAPSULE | Refills: 2 | Status: SHIPPED | OUTPATIENT
Start: 2023-06-16 | End: 2023-07-16

## 2023-06-16 NOTE — TELEPHONE ENCOUNTER
Caller: [facility]     Doctor: Dr Paez     Reason for call: Pharmacy calling asking if provider could very how many times a day patient is taking medications    Call back#: [phone]

## 2023-06-16 NOTE — TELEPHONE ENCOUNTER
RN s/w pharmacist Che, If pt is to take 50mg in the AM and 150mg in the PM the pt will need a script for the 100mg lyrica as well.    --please advise thank you--  Send refill for 100mg lyrica to be taken at HS

## 2023-07-05 ENCOUNTER — HOSPITAL ENCOUNTER (OUTPATIENT)
Dept: MAMMOGRAPHY | Facility: MEDICAL CENTER | Age: 63
Discharge: HOME | End: 2023-07-05
Payer: COMMERCIAL

## 2023-07-05 VITALS — BODY MASS INDEX: 36.99 KG/M2 | WEIGHT: 201 LBS | HEIGHT: 62 IN

## 2023-07-05 DIAGNOSIS — Z12.31 ENCOUNTER FOR SCREENING MAMMOGRAM FOR MALIGNANT NEOPLASM OF BREAST: ICD-10-CM

## 2023-07-05 PROCEDURE — 77063 BREAST TOMOSYNTHESIS BI: CPT

## 2023-07-05 PROCEDURE — 77067 SCR MAMMO BI INCL CAD: CPT

## 2023-07-25 ENCOUNTER — TELEPHONE (OUTPATIENT)
Dept: PAIN MEDICINE | Facility: MEDICAL CENTER | Age: 63
End: 2023-07-25

## 2023-07-25 NOTE — TELEPHONE ENCOUNTER
----- Message from Brian Oharana sent at 7/25/2023 12:49 PM EDT -----  Regarding: Diagnostic codes  Contact: [phone]  My  has been speaking with your billing department to try to get the diagnostic codes added to the bill resulting from our 6/14 appt. They claim they can’t add to what they provided us. Is there a way your office can email me the codes below on your letterhead? M96.1, M54.16, M48.061 & M47.816. These were the codes given to him but they couldn’t put on the copy of the bill. Please let me know. My email is [email or web address]  Thank you  Brian Moore

## 2023-08-30 ENCOUNTER — OFFICE VISIT (OUTPATIENT)
Dept: PAIN MEDICINE | Facility: MEDICAL CENTER | Age: 63
End: 2023-08-30

## 2023-08-30 VITALS
HEIGHT: 62 IN | OXYGEN SATURATION: 96 % | SYSTOLIC BLOOD PRESSURE: 147 MMHG | HEART RATE: 79 BPM | DIASTOLIC BLOOD PRESSURE: 83 MMHG | BODY MASS INDEX: 37.54 KG/M2 | WEIGHT: 204 LBS

## 2023-08-30 DIAGNOSIS — M47.816 LUMBAR SPONDYLOSIS: ICD-10-CM

## 2023-08-30 DIAGNOSIS — M54.16 LUMBAR RADICULOPATHY: Primary | ICD-10-CM

## 2023-08-30 DIAGNOSIS — M96.1 POSTLAMINECTOMY SYNDROME OF LUMBAR REGION: ICD-10-CM

## 2023-08-30 DIAGNOSIS — G57.02 PIRIFORMIS SYNDROME OF LEFT SIDE: ICD-10-CM

## 2023-08-30 PROCEDURE — 99214 OFFICE O/P EST MOD 30 MIN: CPT | Performed by: PHYSICIAN ASSISTANT

## 2023-08-30 RX ORDER — ESTRADIOL 0.1 MG/G
CREAM VAGINAL
COMMUNITY
Start: 2023-07-31

## 2023-08-30 RX ORDER — CLOBETASOL PROPIONATE 0.5 MG/G
CREAM TOPICAL
COMMUNITY
Start: 2023-08-14

## 2023-08-30 NOTE — PROGRESS NOTES
Assessment:  1. Lumbar radiculopathy    2. Postlaminectomy syndrome of lumbar region    3. Lumbar spondylosis    4. Piriformis syndrome of left side        Plan:  While the patient was in the office today, I did have a thorough conversation regarding their chronic pain syndrome, medication management, and treatment plan options. After discussing options, I feel it is medically necessary and reasonable to proceed with an updated MRI of the lumbar spine. She continues to perform routine home exercise program including yoga however her pain persists. Once we obtain the MRI results we will contact her with the next steps in her treatment plan which may require an epidural steroid injection versus piriformis injection. Consider follow-up with surgeon if all else fails. Over-the-counter NSAIDs as needed. Decrease Lyrica to 100mg at bedtime as the daytime dose is causing sedation. My impressions and treatment recommendations were discussed in detail with the patient who verbalized understanding and had no further questions. Discharge instructions were provided. I personally saw and examined the patient and I agree with the above discussed plan of care. Orders Placed This Encounter   Procedures   • MRI lumbar spine with and without contrast     Standing Status:   Future     Standing Expiration Date:   8/30/2027     Scheduling Instructions: There is no preparation for this test. Please leave your jewelry and valuables at home, wedding rings are the exception. All patients will be required to change into a hospital gown and pants. Street clothes are not permitted in the MRI. Magnetic nail polish must be removed prior to arrival for your test. Please bring your insurance cards, a form of photo ID and a list of your medications with you. Arrive 15 minutes prior to your appointment time in order to register.  Please bring any prior CT or MRI studies of this area that were not performed at a [facility] facility. To schedule this appointment, please contact Central Scheduling at [phone]. Prior to your appointment, please make sure you complete the MRI Screening Form when you e-Check in for your appointment. This will be available starting 7 days before your appointment in [address] [place] [address]. You may receive an e-mail with an activation code if you do not have a [employer] account. If you do not have access to a device, we will complete your screening at your appointment. Order Specific Question:   What is the patient's sedation requirement? If Medication for Claustrophobia is selected, order medication at this point. Answer:   No Sedation     Order Specific Question:   Does this procedure require the 3T MRI at [facility] or [place]?     Answer:   No     Order Specific Question:   Release to patient through [facility]     Answer:   Immediate     Order Specific Question:   Is order priority selected as STAT? Answer:   No     Order Specific Question:   Reason for Exam (FREE TEXT)     Answer:   lumbar radiculopathy,  h/o laminectomy,  no improvement with home exercise program.     New Medications Ordered This Visit   Medications   • clobetasol (TEMOVATE) 0.05 % cream     Sig: Apply to affected area twice a day for 30 days, then once a day for 30 days, then 1-2x/week   • estradiol (ESTRACE) 0.1 mg/g vaginal cream     Sig: Insert applicator (1/4 full) into vagina twice weekly. History of Present Illness:  Azul Redmond is a 59 y.o. female who presents for a follow up office visit in regards to chronic low back pain. The patient’s current symptoms include chronic low back pain that she presently rates a 9 out of 10 on the pain scale and describes it as an intermittent sharp, shooting and electric shock type of pain. She has pain that radiates into the left buttock and newer pain that is in the anterior aspect of the left leg below the knee.   She has a significant increase in pain with prolonged standing, prolonged sitting and walking. She is taking Lyrica 50 mg in the morning and 100 mg nightly but she is feeling too sedated and is really unsure if it is providing relief. The patient has been performing home exercises including yoga for at least the past 8 weeks however she is noting increasing pain despite that. I have personally reviewed and/or updated the patient's past medical history, past surgical history, family history, social history, current medications, allergies, and vital signs today. Review of Systems   Respiratory: Negative for shortness of breath. Cardiovascular: Negative for chest pain. Gastrointestinal: Negative for constipation, diarrhea, nausea and vomiting. Musculoskeletal: Positive for back pain, gait problem, joint swelling and myalgias. Negative for arthralgias. Skin: Negative for rash. Neurological: Negative for dizziness, seizures and weakness. All other systems reviewed and are negative.       Patient Active Problem List   Diagnosis   • Lumbar radiculitis   • Piriformis syndrome of left side       Past Medical History:   Diagnosis Date   • Chronic back pain    • Hyperlipidemia    • Hypertension    • Type 2 diabetes mellitus ([address])        Past Surgical History:   Procedure Laterality Date   • BREAST BIOPSY Left     benign   • HYSTERECTOMY  2005   • LAMINECTOMY  August 22, 2023       Family History   Problem Relation Age of Onset   • Breast cancer Mother 72   • Cancer Mother         Breast Cancer   • Diabetes Father        Social History     Occupational History   • Not on file   Tobacco Use   • Smoking status: Never   • Smokeless tobacco: Never   Vaping Use   • Vaping Use: Never used   Substance and Sexual Activity   • Alcohol use: Not Currently   • Drug use: Not Currently     Types: Marijuana   • Sexual activity: Yes     Partners: Male     Birth control/protection: None       Current Outpatient Medications on File Prior to Visit   Medication Sig   • aspirin (ECOTRIN LOW STRENGTH) 81 mg EC tablet Take 81 mg by mouth daily   • clobetasol (TEMOVATE) 0.05 % cream Apply to affected area twice a day for 30 days, then once a day for 30 days, then 1-2x/week   • estradiol (ESTRACE) 0.1 mg/g vaginal cream Insert applicator (1/4 full) into vagina twice weekly. • famotidine (PEPCID) 20 mg/2 mL SOLN    • glimepiride (AMARYL) 2 mg tablet Take 1 tablet by mouth daily   • lisinopril (ZESTRIL) 5 mg tablet Take 10 mg by mouth daily   • loratadine (CLARITIN) 10 mg tablet    • pregabalin (Lyrica) 100 mg capsule Take 1 capsule (100 mg total) by mouth daily at bedtime Take 1 capsule with the 50mg capsule at bedtime   • simvastatin (ZOCOR) 20 mg tablet Take 1 tablet by mouth every evening   • valACYclovir (VALTREX) 1,000 mg tablet    • [DISCONTINUED] pregabalin (LYRICA) 50 mg capsule Take 1 capsule (50 mg total) by mouth 2 (two) times a day   • methylPREDNISolone 4 MG tablet therapy pack Use as directed on package (Patient not taking: Reported on 8/30/2023)     No current facility-administered medications on file prior to visit. Allergies   Allergen Reactions   • Acetazolamide Hives   • Red Dye - Food Allergy Other (See Comments)     unknown   • Sulfa Antibiotics Other (See Comments)       Physical Exam:    /83   Pulse 79   Ht 5' 2" (1.575 m)   Wt 92.5 kg (204 lb)   SpO2 96%   BMI 37.31 kg/m²     Constitutional:normal, well developed, well nourished, alert, in no distress and non-toxic and no overt pain behavior.   Eyes:anicteric  HEENT:grossly intact  Pulmonary:even and unlabored  Cardiovascular:No edema or pitting edema present  Skin:Normal without rashes or lesions and well hydrated  Psychiatric:Mood and affect appropriate  Neurologic:Cranial Nerves II-XII grossly intact  Musculoskeletal: Gait is slow and slightly antalgic, tender to palpation over the left piriformis    Imaging

## 2023-09-01 DIAGNOSIS — E11.9 TYPE 2 DIABETES MELLITUS WITHOUT COMPLICATION, UNSPECIFIED WHETHER LONG TERM INSULIN USE (HCC): Primary | ICD-10-CM

## 2023-09-15 ENCOUNTER — APPOINTMENT (OUTPATIENT)
Dept: LAB | Facility: MEDICAL CENTER | Age: 63
End: 2023-09-15

## 2023-09-15 DIAGNOSIS — R80.9 TYPE 2 DIABETES MELLITUS WITH MICROALBUMINURIA, WITH LONG-TERM CURRENT USE OF INSULIN (HCC): ICD-10-CM

## 2023-09-15 DIAGNOSIS — E11.9 TYPE 2 DIABETES MELLITUS WITHOUT COMPLICATION, UNSPECIFIED WHETHER LONG TERM INSULIN USE (HCC): ICD-10-CM

## 2023-09-15 DIAGNOSIS — E11.29 TYPE 2 DIABETES MELLITUS WITH MICROALBUMINURIA, WITH LONG-TERM CURRENT USE OF INSULIN (HCC): ICD-10-CM

## 2023-09-15 DIAGNOSIS — Z79.4 TYPE 2 DIABETES MELLITUS WITH MICROALBUMINURIA, WITH LONG-TERM CURRENT USE OF INSULIN (HCC): ICD-10-CM

## 2023-09-15 LAB
ALBUMIN SERPL BCP-MCNC: 4.3 G/DL (ref 3.5–5)
ALP SERPL-CCNC: 79 U/L (ref 34–104)
ALT SERPL W P-5'-P-CCNC: 77 U/L (ref 7–52)
ANION GAP SERPL CALCULATED.3IONS-SCNC: 7 MMOL/L
AST SERPL W P-5'-P-CCNC: 48 U/L (ref 13–39)
BILIRUB SERPL-MCNC: 0.85 MG/DL (ref 0.2–1)
BUN SERPL-MCNC: 19 MG/DL (ref 5–25)
CALCIUM SERPL-MCNC: 10 MG/DL (ref 8.4–10.2)
CHLORIDE SERPL-SCNC: 100 MMOL/L (ref 96–108)
CHOLEST SERPL-MCNC: 126 MG/DL
CO2 SERPL-SCNC: 29 MMOL/L (ref 21–32)
CREAT SERPL-MCNC: 0.8 MG/DL (ref 0.6–1.3)
EST. AVERAGE GLUCOSE BLD GHB EST-MCNC: 235 MG/DL
GFR SERPL CREATININE-BSD FRML MDRD: 79 ML/MIN/1.73SQ M
GLUCOSE P FAST SERPL-MCNC: 238 MG/DL (ref 65–99)
HBA1C MFR BLD: 9.8 %
HDLC SERPL-MCNC: 30 MG/DL
LDLC SERPL CALC-MCNC: 65 MG/DL (ref 0–100)
NONHDLC SERPL-MCNC: 96 MG/DL
POTASSIUM SERPL-SCNC: 4.3 MMOL/L (ref 3.5–5.3)
PROT SERPL-MCNC: 6.9 G/DL (ref 6.4–8.4)
SODIUM SERPL-SCNC: 136 MMOL/L (ref 135–147)
TRIGL SERPL-MCNC: 155 MG/DL

## 2023-09-15 PROCEDURE — 80053 COMPREHEN METABOLIC PANEL: CPT

## 2023-09-15 PROCEDURE — 36415 COLL VENOUS BLD VENIPUNCTURE: CPT

## 2023-09-15 PROCEDURE — 83036 HEMOGLOBIN GLYCOSYLATED A1C: CPT

## 2023-09-15 PROCEDURE — 80061 LIPID PANEL: CPT

## 2023-09-16 DIAGNOSIS — M54.16 LUMBAR RADICULITIS: ICD-10-CM

## 2023-09-16 DIAGNOSIS — M96.1 POSTLAMINECTOMY SYNDROME OF LUMBAR REGION: ICD-10-CM

## 2023-09-18 DIAGNOSIS — M96.1 POSTLAMINECTOMY SYNDROME OF LUMBAR REGION: ICD-10-CM

## 2023-09-18 DIAGNOSIS — M54.16 LUMBAR RADICULITIS: ICD-10-CM

## 2023-09-18 RX ORDER — PREGABALIN 100 MG/1
CAPSULE ORAL
Qty: 30 CAPSULE | Refills: 0 | OUTPATIENT
Start: 2023-09-18

## 2023-09-18 NOTE — TELEPHONE ENCOUNTER
Reason for call:   [x] Refill   [] Prior Auth  [] Other:     Office:   [] PCP/Provider -   [x] Speciality/Provider - spine and pain  Medication: lyrica  Dose/Frequency:  100mg and 50 mg    Quantity: 30/90    [facility]    Does the patient have enough for 3 days? [] Yes   [x] No - Send as HP to POD      Pt states she doesn't have enough of the 100mg and doesn't understand why her rx was refused. She wants someone to call her. She just had a follow up appt not that long ago.

## 2023-09-19 RX ORDER — PREGABALIN 100 MG/1
100 CAPSULE ORAL
Qty: 90 CAPSULE | Refills: 1 | Status: SHIPPED | OUTPATIENT
Start: 2023-09-19 | End: 2023-10-19

## 2023-09-25 ENCOUNTER — APPOINTMENT (OUTPATIENT)
Dept: LAB | Facility: MEDICAL CENTER | Age: 63
End: 2023-09-25
Payer: COMMERCIAL

## 2023-09-25 ENCOUNTER — HOSPITAL ENCOUNTER (OUTPATIENT)
Facility: MEDICAL CENTER | Age: 63
Discharge: HOME | End: 2023-09-25

## 2023-09-25 DIAGNOSIS — R30.0 DYSURIA: ICD-10-CM

## 2023-09-25 DIAGNOSIS — M54.16 LUMBAR RADICULOPATHY: ICD-10-CM

## 2023-09-25 PROCEDURE — G1004 CDSM NDSC: HCPCS

## 2023-09-25 PROCEDURE — A9585 GADOBUTROL INJECTION: HCPCS | Performed by: PHYSICIAN ASSISTANT

## 2023-09-25 PROCEDURE — 87086 URINE CULTURE/COLONY COUNT: CPT

## 2023-09-25 PROCEDURE — 72158 MRI LUMBAR SPINE W/O & W/DYE: CPT

## 2023-09-25 RX ORDER — GADOBUTROL 604.72 MG/ML
9 INJECTION INTRAVENOUS
Status: COMPLETED | OUTPATIENT
Start: 2023-09-25 | End: 2023-09-25

## 2023-09-25 RX ADMIN — GADOBUTROL 9 ML: 604.72 INJECTION INTRAVENOUS at 15:07

## 2023-09-26 LAB — BACTERIA UR CULT: NORMAL

## 2023-09-29 ENCOUNTER — TELEPHONE (OUTPATIENT)
Dept: PAIN MEDICINE | Facility: MEDICAL CENTER | Age: 63
End: 2023-09-29

## 2023-09-29 NOTE — TELEPHONE ENCOUNTER
----- Message from Roberta Bhatt PA-C sent at 9/29/2023  8:35 AM EDT -----  Please let patient know her lumbar spine MRI reveals post op changes at L3-4, L4-5, multiple disc bulges/protrusion,  severe foraminal stenosis at L3-4 (RT) and L4-5 (LT), moderate L5-S1 (LT). Can discuss repeat TFESI or follow up visit with Dr. Pepe Meza. My concern with repeating steroid injections is her elevated HbA1C.

## 2023-10-27 ENCOUNTER — OFFICE VISIT (OUTPATIENT)
Dept: URGENT CARE | Facility: MEDICAL CENTER | Age: 63
End: 2023-10-27
Payer: COMMERCIAL

## 2023-10-27 VITALS
DIASTOLIC BLOOD PRESSURE: 81 MMHG | SYSTOLIC BLOOD PRESSURE: 166 MMHG | OXYGEN SATURATION: 98 % | HEART RATE: 85 BPM | RESPIRATION RATE: 18 BRPM | TEMPERATURE: 98.2 F

## 2023-10-27 DIAGNOSIS — J03.90 ACUTE TONSILLITIS, UNSPECIFIED ETIOLOGY: Primary | ICD-10-CM

## 2023-10-27 PROCEDURE — 99213 OFFICE O/P EST LOW 20 MIN: CPT

## 2023-10-27 RX ORDER — AMOXICILLIN 500 MG/1
500 CAPSULE ORAL EVERY 8 HOURS SCHEDULED
Qty: 21 CAPSULE | Refills: 0 | Status: SHIPPED | OUTPATIENT
Start: 2023-10-27 | End: 2023-11-03

## 2023-10-27 RX ORDER — PEN NEEDLE, DIABETIC 32GX 5/32"
NEEDLE, DISPOSABLE MISCELLANEOUS
COMMUNITY
Start: 2023-09-25

## 2023-10-27 RX ORDER — FLUCONAZOLE 150 MG/1
150 TABLET ORAL ONCE
Qty: 1 TABLET | Refills: 0 | Status: SHIPPED | OUTPATIENT
Start: 2023-10-27 | End: 2023-10-27

## 2023-10-27 RX ORDER — INSULIN GLARGINE 100 [IU]/ML
INJECTION, SOLUTION SUBCUTANEOUS
COMMUNITY
Start: 2023-09-28

## 2023-10-27 NOTE — PATIENT INSTRUCTIONS
You were prescribed a course of amoxicillin, take as directed. Treat other symptoms as below. As you are diabetic, it is recommended you talk to the pharmacist or your PCP for appropriate over the counter medication to take for your symptoms. Fever/Body Aches: We recommend you take 600mg ibuprofen every 6 hours or tylenol 650mg every 6 hours as needed for fever. If needed, you can alternate these medications so that you take one medication every 3 hours. For instance, at noon take ibuprofen, then at 3pm take tylenol, then at 6pm take ibuprofen. Cough: Delsym, an over the counter cough medication may be used every 12 hours as needed. Mucinex XR (guafenisen) 600 mg tablets may be used to thin out the mucous to make it easier to cough up. You may take 1-2 tablets twice per day as needed. Utilizing a vaporizer/humidifier may help, as well as increasing fluids. Sore Throat: Salt water gargles with 1 teaspoon of salt dissolved in 6-8 oz of water as needed can help with a sore throat, as can honey, drinking plenty of liquids, eating soft foods. If severe, can utilize OTC chloraseptic spray. Nasal Congestion: Over the counter allergy medication like Claritin, Allegra or Zyrtec can help with nasal congestion and post nasal drip. Over the counter saline or steroid nasal sprays like Flonase can help with nasal congestion and post nasal drip as well. Over the counter decongestants such as Sudafed may also help. Upset Stomach: Drink plenty of fluids. May utilize Gatorade, Pedialyte, or other electrolyte solutions to supplement. Eat bland foods (ex: BRAT - bananas, rice, applesauce, toast) and slowly advance to other foods as tolerated. Please note, if you have heart issues or high blood pressure, the cough/cold medication of choice is Coricidin. Talk to your doctor before trying any new medications. Follow up with PCP in 3-5 days. Proceed to  ER if symptoms worsen.     Tonsillitis   AMBULATORY CARE:   Tonsillitis is inflammation of your tonsils. Tonsils are the lumps of tissue on both sides of the back of your throat. Tonsils are part of your immune system. They help you fight infections. Tonsillitis is usually caused by bacteria or a virus. Recurrent tonsillitis is tonsillitis that happens at least 5 times in 1 year. Chronic tonsillitis lasts 3 months or longer. Common symptoms include the following:   Severe sore throat    Red, swollen tonsils    Painful swallowing    Fever and chills    Bad breath    White spots on the tonsils    Call your local emergency number (911 in the [address]) if:   You have trouble breathing because your tonsils are swollen. Seek care immediately if:   You are not able to eat or drink because of the pain. Your voice changes. You have increased swelling or jaw pain, or you have trouble opening your mouth. You have a stiff neck. You have not urinated in 12 hours or are very weak or tired. You have trouble sleeping and wake up trying to catch your breath. Call your doctor if:   You have a fever. Your pain gets worse or does not get better after you take pain medicine. Your sore throat is not better after you have finished antibiotic treatment. You have questions or concerns about your condition or care. Treatment for tonsillitis  may include any of the following:  Acetaminophen  decreases pain and fever. It is available without a doctor's order. Ask how much to take and how often to take it. Follow directions. Read the labels of all other medicines you are using to see if they also contain acetaminophen, or ask your doctor or pharmacist. Acetaminophen can cause liver damage if not taken correctly. NSAIDs , such as ibuprofen, help decrease swelling, pain, and fever. This medicine is available with or without a doctor's order. NSAIDs can cause stomach bleeding or kidney problems in certain people.  If you take blood thinner medicine, always ask your healthcare provider if NSAIDs are safe for you. Always read the medicine label and follow directions. Antibiotics  help treat a bacterial infection. Steroids  may be given for a short time to relieve swelling. A tonsillectomy  is surgery to remove your tonsils. You may need surgery if you have chronic or recurrent tonsillitis. Surgery is also done if antibiotics are not getting rid of your tonsillitis. Rest when you feel it is needed:  Slowly start to do more each day. Drink liquids as directed: You may need to drink more liquid than usual to help prevent dehydration. Ask how much liquid to drink each day and which liquids are best for you. Gargle with warm salt water: This may help decrease throat pain. Mix 1 teaspoon of salt in 8 ounces of warm water. Ask how often you should do this. Prevent tonsillitis:  Bacteria and viruses that lead to tonsillitis can spread through coughing, sneezing, or touching. The following can help prevent infections:  Wash your hands often. Wash your hands several times each day. Wash after you use the bathroom, change a child's diaper, and before you prepare or eat food. Use soap and water. Rinse with warm, running water for several seconds. Then dry your hands with a clean towel or paper towel. Use hand  that contains alcohol if soap and water are not available. Do not touch your eyes, nose, or mouth without washing your hands first.         Cover a sneeze or cough. Use a tissue that covers your mouth and nose. Throw the tissue away immediately. If you do not have a tissue, use the bend of your elbow. Then wash your hands well or use a hand . Prevent person-to-person spread of germs. Do not share food or drinks with anyone. Return to work, school, or other activities as directed. Your provider may want you to wait until your fever is gone for at least 24 hours. Ask about vaccines you may need.   Vaccines help protect you from some bacterial and viral infections. Get the influenza (flu) vaccine as soon as recommended each year, usually in September or October. Get a COVID-19 vaccine and recommended boosters. Get a pneumonia vaccine, if recommended. The pneumonia vaccine is usually recommended every 5 years. Your provider will tell you which other vaccines you need, and when to get them. Follow up with your doctor as directed:  Write down your questions so you remember to ask them during your visits. © Copyright Katheryn Reinoso 2023 Information is for End User's use only and may not be sold, redistributed or otherwise used for commercial purposes. The above information is an  only. It is not intended as medical advice for individual conditions or treatments. Talk to your doctor, nurse or pharmacist before following any medical regimen to see if it is safe and effective for you.

## 2023-10-27 NOTE — PROGRESS NOTES
[facility]        NAME: Jose Torres is a 59 y.o. female  : 1960    MRN: [id number]  DATE: 2023  TIME: 12:06 PM    Assessment and Plan   Acute tonsillitis, unspecified etiology [J03.90]  1. Acute tonsillitis, unspecified etiology  amoxicillin (AMOXIL) 500 mg capsule    fluconazole (DIFLUCAN) 150 mg tablet        Examination consistent with tonsillitis. Prescribed antibiotics. Advised symptomatic treatment. Patient Instructions     You were prescribed a course of amoxicillin, take as directed. Treat other symptoms as below. As you are diabetic, it is recommended you talk to the pharmacist or your PCP for appropriate over the counter medication to take for your symptoms. Fever/Body Aches: We recommend you take 600mg ibuprofen every 6 hours or tylenol 650mg every 6 hours as needed for fever. If needed, you can alternate these medications so that you take one medication every 3 hours. For instance, at noon take ibuprofen, then at 3pm take tylenol, then at 6pm take ibuprofen. Cough: Delsym, an over the counter cough medication may be used every 12 hours as needed. Mucinex XR (guafenisen) 600 mg tablets may be used to thin out the mucous to make it easier to cough up. You may take 1-2 tablets twice per day as needed. Utilizing a vaporizer/humidifier may help, as well as increasing fluids. Sore Throat: Salt water gargles with 1 teaspoon of salt dissolved in 6-8 oz of water as needed can help with a sore throat, as can honey, drinking plenty of liquids, eating soft foods. If severe, can utilize OTC chloraseptic spray. Nasal Congestion: Over the counter allergy medication like Claritin, Allegra or Zyrtec can help with nasal congestion and post nasal drip. Over the counter saline or steroid nasal sprays like Flonase can help with nasal congestion and post nasal drip as well. Over the counter decongestants such as Sudafed may also help. Upset Stomach: Drink plenty of fluids.  May utilize Gatorade, Pedialyte, or other electrolyte solutions to supplement. Eat bland foods (ex: BRAT - bananas, rice, applesauce, toast) and slowly advance to other foods as tolerated. Please note, if you have heart issues or high blood pressure, the cough/cold medication of choice is Coricidin. Talk to your doctor before trying any new medications. Follow up with PCP in 3-5 days. Proceed to  ER if symptoms worsen. Chief Complaint     Chief Complaint   Patient presents with    Cough     Pt reports cough and lost voice since Tues. Home COVID test was negative. History of Present Illness       Patient states about 3 days ago she started with a hoarse voice and pain in the lower throat. Her symptoms have been worsening over the past few days. She started with a cough the second day of symptoms. The cough is dry. She denies fever, chills, myalgias. She has tried tessalon without relief. Review of Systems   Review of Systems   Constitutional:  Negative for appetite change, chills and fever. HENT:  Positive for rhinorrhea (minimal), sore throat and voice change (hoarse). Negative for congestion, ear discharge, ear pain, postnasal drip, sinus pressure, sinus pain and trouble swallowing. Respiratory:  Positive for cough. Negative for chest tightness, shortness of breath and wheezing. Gastrointestinal:  Negative for abdominal pain, diarrhea, nausea and vomiting. Musculoskeletal:  Negative for myalgias. Skin:  Negative for rash.          Current Medications       Current Outpatient Medications:     amoxicillin (AMOXIL) 500 mg capsule, Take 1 capsule (500 mg total) by mouth every 8 (eight) hours for 7 days, Disp: 21 capsule, Rfl: 0    clobetasol (TEMOVATE) 0.05 % cream, Apply to affected area twice a day for 30 days, then once a day for 30 days, then 1-2x/week, Disp: , Rfl:     Droplet Pen Needles 32G X 4 MM MISC, USE FOR INSULIN INJECTIONS ONCE DAILY, Disp: , Rfl:     estradiol (ESTRACE) 0.1 mg/g vaginal cream, Insert applicator (1/4 full) into vagina twice weekly. , Disp: , Rfl:     famotidine (PEPCID) 20 mg/2 mL SOLN, , Disp: , Rfl:     fluconazole (DIFLUCAN) 150 mg tablet, Take 1 tablet (150 mg total) by mouth once for 1 dose, Disp: 1 tablet, Rfl: 0    glimepiride (AMARYL) 2 mg tablet, Take 1 tablet by mouth daily, Disp: , Rfl:     Insulin Glargine Solostar 100 UNIT/ML SOPN, inject 14 units subcutaneously once daily, Disp: , Rfl:     Insulin Glargine w/ Trans Port 100 UNIT/ML SOPN, Inject 14 Units under the skin daily, Disp: , Rfl:     lisinopril (ZESTRIL) 5 mg tablet, Take 10 mg by mouth daily, Disp: , Rfl:     loratadine (CLARITIN) 10 mg tablet, , Disp: , Rfl:     pregabalin (Lyrica) 100 mg capsule, Take 1 capsule (100 mg total) by mouth daily at bedtime Take 1 capsule with the 50mg capsule at bedtime, Disp: 90 capsule, Rfl: 1    simvastatin (ZOCOR) 20 mg tablet, Take 1 tablet by mouth every evening, Disp: , Rfl:     valACYclovir (VALTREX) 1,000 mg tablet, Take 1,000 mg by mouth Only takes when needed. , Disp: , Rfl:     aspirin (ECOTRIN LOW STRENGTH) 81 mg EC tablet, Take 81 mg by mouth daily (Patient not taking: Reported on 10/27/2023), Disp: , Rfl:     methylPREDNISolone 4 MG tablet therapy pack, Use as directed on package (Patient not taking: Reported on 8/30/2023), Disp: 1 each, Rfl: 0    Current Allergies     Allergies as of 10/27/2023 - Reviewed 10/27/2023   Allergen Reaction Noted    Acetazolamide Hives 10/12/2020    Red dye - food allergy Other (See Comments) 07/29/2014    Sulfa antibiotics Other (See Comments) 12/04/2001            The following portions of the patient's history were reviewed and updated as appropriate: allergies, current medications, past family history, past medical history, past social history, past surgical history and problem list.     Past Medical History:   Diagnosis Date    Chronic back pain     Hyperlipidemia     Hypertension     Type 2 diabetes mellitus ([address]) Past Surgical History:   Procedure Laterality Date    BREAST BIOPSY Left     benign    HYSTERECTOMY  2005    LAMINECTOMY  August 22, 2023       Family History   Problem Relation Age of Onset    Breast cancer Mother 72    Cancer Mother         Breast Cancer    Diabetes Father          Medications have been verified. Objective   /81   Pulse 85   Temp 98.2 °F (36.8 °C)   Resp 18   SpO2 98%        Physical Exam     Physical Exam  Vitals and nursing note reviewed. Constitutional:       General: She is not in acute distress. Appearance: Normal appearance. She is not ill-appearing. HENT:      Right Ear: Tympanic membrane, ear canal and external ear normal.      Left Ear: Tympanic membrane, ear canal and external ear normal.      Nose: No congestion or rhinorrhea. Mouth/Throat:      Mouth: Mucous membranes are moist.      Pharynx: Posterior oropharyngeal erythema present. No oropharyngeal exudate. Tonsils: Tonsillar exudate present. 2+ on the right. 2+ on the left. Eyes:      General:         Right eye: No discharge. Left eye: No discharge. Extraocular Movements: Extraocular movements intact. Cardiovascular:      Rate and Rhythm: Normal rate and regular rhythm. Pulses: Normal pulses. Heart sounds: Normal heart sounds. Pulmonary:      Effort: Pulmonary effort is normal. No respiratory distress. Breath sounds: Normal breath sounds. No wheezing, rhonchi or rales. Abdominal:      General: Abdomen is flat. Bowel sounds are normal. There is no distension. Palpations: Abdomen is soft. Tenderness: There is no abdominal tenderness. There is no guarding. Musculoskeletal:      Cervical back: Neck supple. Tenderness present. Lymphadenopathy:      Cervical: Cervical adenopathy present. Skin:     General: Skin is warm and dry. Neurological:      Mental Status: She is alert.

## 2023-11-21 ENCOUNTER — TELEPHONE (OUTPATIENT)
Age: 63
End: 2023-11-21

## 2023-11-21 NOTE — TELEPHONE ENCOUNTER
Caller: Shonda Conrad, pt    Doctor: Joshua Pineda    Reason for call: pt fell on a cruise last week. At lunch time today she felt like something snapped but she said she knows that it didn't (just felt that way)  She wants to do know what she should do. Pain level when moving leg 10/10. Pt is taking ibuprofen and aleve but it's not working. She's not sure if she should use ice or heat, steroids.   If someone can please return her call    Call back#: [phone]

## 2023-11-22 NOTE — TELEPHONE ENCOUNTER
S/w pt and advised of same. Pt verbalized understanding. Pt also advised as per MMG she can reach out to her Endocrinologist or PCP to inquire about taking the steroid dose pack in regard to her blood sugars. Pt powers snot have an epidural scheduled at this time as she is working toward getting her HgbA1c down.

## 2023-11-22 NOTE — TELEPHONE ENCOUNTER
Agree with RN recommendation to go to ER if pain is unbearable. Can tylenol alternate with OTC NSAIDS, ice/heat and topical pain creams. Is there an KAMLA scheduled in the future?

## 2023-11-22 NOTE — TELEPHONE ENCOUNTER
S/w pt regarding previous. Pt advised to go to the ER to be seen since this is an acute injury. Pt is refusing and states the pain has eased up some, if it happens again she will go to the ER. Pt has a steroid dose pack on hand but has not taken it yet due to attempting to get her A1C down in order to be able to have an injection.

## 2023-11-27 ENCOUNTER — TELEPHONE (OUTPATIENT)
Dept: PAIN MEDICINE | Facility: CLINIC | Age: 63
End: 2023-11-27

## 2023-11-27 NOTE — TELEPHONE ENCOUNTER
Caller: Deepak Spivey    Doctor: Ann Marie Hunt    Reason for call: patient asking for an appt today please or Virtual if possible she is unable to walk, I offered and scheduled appt for 11/29 at 1:30 pm     Call back#: [phone]

## 2023-11-27 NOTE — TELEPHONE ENCOUNTER
--ANABELA--  RN s/w pt regarding previous. Pt placed on Dr Sandip Porter same day appt for 11/30 ok per JE. Pt with exacerbation of left low back and radiating leg pain. Pt aware can take her skelaxin that was previously ordered for her 800 mg BID and she may take that with her ibuprofen per pt she takes 600 mg TID and may alternate with tylenol 1000 up to TID not to go over 3000 mg in a day. Pt appreciative of sovs with JE and would like him to see her my chart message that she sent so he is "up to date" with what she is feeling.     RN will forward same

## 2023-11-30 ENCOUNTER — TELEPHONE (OUTPATIENT)
Age: 63
End: 2023-11-30

## 2023-11-30 ENCOUNTER — OFFICE VISIT (OUTPATIENT)
Dept: PAIN MEDICINE | Facility: MEDICAL CENTER | Age: 63
End: 2023-11-30

## 2023-11-30 VITALS
WEIGHT: 204 LBS | BODY MASS INDEX: 37.54 KG/M2 | SYSTOLIC BLOOD PRESSURE: 136 MMHG | OXYGEN SATURATION: 96 % | HEIGHT: 62 IN | HEART RATE: 81 BPM | DIASTOLIC BLOOD PRESSURE: 77 MMHG

## 2023-11-30 DIAGNOSIS — M54.16 LUMBAR RADICULITIS: Primary | ICD-10-CM

## 2023-11-30 DIAGNOSIS — M54.16 LUMBAR RADICULITIS: ICD-10-CM

## 2023-11-30 DIAGNOSIS — M79.18 MYOFASCIAL PAIN SYNDROME: Primary | ICD-10-CM

## 2023-11-30 DIAGNOSIS — G57.02 PIRIFORMIS SYNDROME, LEFT: ICD-10-CM

## 2023-11-30 PROCEDURE — 99214 OFFICE O/P EST MOD 30 MIN: CPT | Performed by: PHYSICAL MEDICINE & REHABILITATION

## 2023-11-30 RX ORDER — METAXALONE 800 MG/1
800 TABLET ORAL
Qty: 90 TABLET | Refills: 0 | Status: SHIPPED | OUTPATIENT
Start: 2023-11-30 | End: 2024-02-28

## 2023-11-30 NOTE — PROGRESS NOTES
Assessment:  1. Myofascial pain syndrome    2. Piriformis syndrome, left    3. Lumbar radiculitis        Plan: At this time we will schedule patient for a left piriformis injection. Complete risks and benefits including bleeding, infection, tissue reaction, allergic reaction were discussed. Verbal consent obtained. .  Patient has started insulin to help manage her diabetes. My impressions and treatment recommendations were discussed in detail with the patient who verbalized understanding and had no further questions. Discharge instructions were provided. I personally saw and examined the patient and I agree with the above discussed plan of care. Orders Placed This Encounter   Procedures    FL spine and pain procedure     Standing Status:   Future     Standing Expiration Date:   11/30/2024     Order Specific Question:   Reason for Exam:     Answer:   (L) piriformis injection     Order Specific Question:   Anticoagulant hold needed? Answer:   no     No orders of the defined types were placed in this encounter. History of Present Illness:  Edith Burden is a 59 y.o. female who presents for a follow up office visit in regards to back and radiating leg pain complaints. Currently she is experiencing significant tenderness over the piriformis muscle reproducing pain on the left side. She is rating the pain as an 8-9/10 and states that she did not really get much relief from the prior left L4-5 transforaminal epidural steroid injection. She does experience pain throughout the entirety of the day which is intermittent in nature and described as burning sharp shooting and electrical.    She has had some episodes of the leg feeling like it was going to give way and fell recently while on a cruise. She is experiencing some significant functional deficits including difficulty walking for long periods.       I have personally reviewed and/or updated the patient's past medical history, past surgical history, family history, social history, current medications, allergies, and vital signs today. Review of Systems   Respiratory:  Negative for shortness of breath. Cardiovascular:  Negative for chest pain. Gastrointestinal:  Negative for constipation, diarrhea, nausea and vomiting. Musculoskeletal:  Positive for back pain, gait problem and myalgias. Negative for arthralgias and joint swelling. Skin:  Negative for rash. Neurological:  Negative for dizziness, seizures and weakness. All other systems reviewed and are negative. Patient Active Problem List   Diagnosis    Lumbar radiculitis    Piriformis syndrome of left side       Past Medical History:   Diagnosis Date    Chronic back pain     Hyperlipidemia     Hypertension     Type 2 diabetes mellitus ([address])        Past Surgical History:   Procedure Laterality Date    BREAST BIOPSY Left     benign    HYSTERECTOMY  2005    LAMINECTOMY  August 22, 2023       Family History   Problem Relation Age of Onset    Breast cancer Mother 72    Cancer Mother         Breast Cancer    Diabetes Father        Social History     Occupational History    Not on file   Tobacco Use    Smoking status: Never    Smokeless tobacco: Never   Vaping Use    Vaping Use: Never used   Substance and Sexual Activity    Alcohol use: Not Currently    Drug use: Not Currently     Types: Marijuana    Sexual activity: Yes     Partners: Male     Birth control/protection: None       Current Outpatient Medications on File Prior to Visit   Medication Sig    clobetasol (TEMOVATE) 0.05 % cream Apply to affected area twice a day for 30 days, then once a day for 30 days, then 1-2x/week    estradiol (ESTRACE) 0.1 mg/g vaginal cream Insert applicator (1/4 full) into vagina twice weekly.     famotidine (PEPCID) 20 mg/2 mL SOLN     glimepiride (AMARYL) 2 mg tablet Take 1 tablet by mouth daily    Insulin Glargine Solostar 100 UNIT/ML SOPN inject 14 units subcutaneously once daily    Insulin Glargine w/ Trans Port 100 UNIT/ML SOPN Inject 14 Units under the skin daily    lisinopril (ZESTRIL) 5 mg tablet Take 10 mg by mouth daily    loratadine (CLARITIN) 10 mg tablet     pregabalin (Lyrica) 100 mg capsule Take 1 capsule (100 mg total) by mouth daily at bedtime Take 1 capsule with the 50mg capsule at bedtime    simvastatin (ZOCOR) 20 mg tablet Take 1 tablet by mouth every evening    valACYclovir (VALTREX) 1,000 mg tablet Take 1,000 mg by mouth Only takes when needed. aspirin (ECOTRIN LOW STRENGTH) 81 mg EC tablet Take 81 mg by mouth daily (Patient not taking: Reported on 10/27/2023)    Droplet Pen Needles 32G X 4 MM MISC USE FOR INSULIN INJECTIONS ONCE DAILY (Patient not taking: Reported on 11/30/2023)    methylPREDNISolone 4 MG tablet therapy pack Use as directed on package (Patient not taking: Reported on 8/30/2023)     No current facility-administered medications on file prior to visit. Allergies   Allergen Reactions    Acetazolamide Hives    Red Dye - Food Allergy Other (See Comments)     unknown    Sulfa Antibiotics Other (See Comments)       Physical Exam:    /77   Pulse 81   Ht 5' 2" (1.575 m)   Wt 92.5 kg (204 lb)   SpO2 96%   BMI 37.31 kg/m²     Constitutional:normal, well developed, well nourished, alert, in no distress and non-toxic and no overt pain behavior.   Eyes:anicteric  HEENT:grossly intact  Neck: symmetric, trachea midline and no masses   Pulmonary:even and unlabored  Cardiovascular:No edema or pitting edema present  Psychiatric:Mood and affect appropriate  Neurologic:Cranial Nerves II-XII grossly intact  Musculoskeletal:normal, except for significant tenderness palpation over the left piriformis musculature reproducing her pain complaint, positive FADIR test on the left    Imaging  MRI lumbar spine with and without contrast: Result Notes     Troy De Santiago PA-C  9/29/2023  8:35 AM EDT       Please let patient know her lumbar spine MRI reveals post op changes at L3-4, L4-5, multiple disc bulges/protrusion,  severe foraminal stenosis at L3-4 (RT) and L4-5 (LT), moderate L5-S1 (LT). Can discuss repeat TFESI or follow up visit with Dr. Shellia Dakin. My concern with repeating steroid injections is her elevated HbA1C. Study Result    Narrative & Impression   MRI LUMBAR SPINE WITH AND WITHOUT CONTRAST     INDICATION: M54.16: Radiculopathy, lumbar region. COMPARISON:  None. TECHNIQUE:  Multiplanar, multisequence imaging of the lumbar spine was performed before and after gadolinium administration. .        IV Contrast:  9 mL of Gadobutrol injection (SINGLE-DOSE)     IMAGE QUALITY:  Diagnostic     FINDINGS:     VERTEBRAL BODIES:  There are 5 lumbar type vertebral bodies. Mild levoscoliosis of the lumbar spine. Slight anterior spondylolisthesis at multiple levels. No acute compression fracture. Type I endplate marrow degenerative change involving the right   endplates at U0-6 similar findings on the left at L4-5. SACRUM:  Normal signal within the sacrum. No evidence of insufficiency or stress fracture. DISTAL CORD AND CONUS:  Normal size and signal within the distal cord and conus. PARASPINAL SOFT TISSUES:  Paraspinal soft tissues are unremarkable. LOWER THORACIC DISC SPACES: Minor left-sided degenerative change at the T12-L1 level with mild left foraminal narrowing. LUMBAR DISC SPACES:     L1-L2: There is small focal left foraminal disc protrusion. There is no canal stenosis or foraminal narrowing. L2-L3: Disc desiccation and loss of disc height with mild annular bulging asymmetric towards the right, more pronounced within the right foraminal and extraforaminal portion of the disc with endplate hypertrophic change. Minor facet arthropathy. There is   no canal stenosis. Only mild foraminal narrowing without foraminal nerve impingement. L3-L4: Disc desiccation and loss of disc height more prominent on the right.  Diffuse annular bulging with a small broad-based right subarticular and foraminal disc protrusion. Patient appears to have undergone prior laminectomy. There is mild canal   stenosis without cauda equina impingement. There is moderately severe right foraminal narrowing with superior compression of the exiting nerve. Correlate for right L3 radiculopathy. L4-L5: Disc desiccation and loss of disc height, more pronounced on the left. Diffuse annular bulging with a small central disc protrusion and a larger left foraminal and extraforaminal disc protrusion. The patient has undergone previous left   laminectomy. Mild canal stenosis with slight distortion of the thecal sac. Narrowing of the lateral recess on the left and severe left-sided foraminal narrowing with superior compression of the exiting nerve. Correlate for left L4 radiculopathy. L5-S1: Normal disc height and signal. Small left subarticular and foraminal disc protrusion. There is no canal stenosis. Moderate left foraminal narrowing with mild mass effect upon the ventral aspect of the exiting nerve. POSTCONTRAST IMAGING: Mild epidural fibrosis noted at the L3-4 and L4-5 levels posterior to the thecal sac, towards the right at L3-4 and towards the left at L4-5. OTHER FINDINGS:  None. IMPRESSION:     Postoperative change at the L3-4 and L4-5 levels with persistent diffuse annular bulging and disc herniations, with superimposed mild epidural fibrosis. Advanced foraminal narrowing on the right at L3-4, on the left at L4-5 and moderate foraminal   narrowing at L5-S1 on the left.         Workstation performed: [id number]

## 2023-11-30 NOTE — TELEPHONE ENCOUNTER
Caller: pt    Doctor: Pilo Choi    Reason for call: pt needs to know the price of their procedure. They are saying they are self pay which I thought we didn't take. Please advise.     Call back#: [phone]

## 2023-12-05 ENCOUNTER — HOSPITAL ENCOUNTER (OUTPATIENT)
Dept: RADIOLOGY | Facility: MEDICAL CENTER | Age: 63
Discharge: HOME | End: 2023-12-05
Admitting: PHYSICAL MEDICINE & REHABILITATION

## 2023-12-05 VITALS
RESPIRATION RATE: 18 BRPM | HEART RATE: 71 BPM | TEMPERATURE: 97.3 F | OXYGEN SATURATION: 96 % | SYSTOLIC BLOOD PRESSURE: 132 MMHG | DIASTOLIC BLOOD PRESSURE: 74 MMHG

## 2023-12-05 DIAGNOSIS — G57.02 PIRIFORMIS SYNDROME, LEFT: ICD-10-CM

## 2023-12-05 DIAGNOSIS — M79.18 MYOFASCIAL PAIN SYNDROME: ICD-10-CM

## 2023-12-05 PROCEDURE — 20552 NJX 1/MLT TRIGGER POINT 1/2: CPT | Performed by: PHYSICAL MEDICINE & REHABILITATION

## 2023-12-05 PROCEDURE — 77002 NEEDLE LOCALIZATION BY XRAY: CPT | Performed by: PHYSICAL MEDICINE & REHABILITATION

## 2023-12-05 RX ORDER — METHYLPREDNISOLONE ACETATE 40 MG/ML
40 INJECTION, SUSPENSION INTRA-ARTICULAR; INTRALESIONAL; INTRAMUSCULAR; PARENTERAL; SOFT TISSUE ONCE
Status: COMPLETED | OUTPATIENT
Start: 2023-12-05 | End: 2023-12-05

## 2023-12-05 RX ORDER — BUPIVACAINE HCL/PF 2.5 MG/ML
1.5 VIAL (ML) INJECTION ONCE
Status: COMPLETED | OUTPATIENT
Start: 2023-12-05 | End: 2023-12-05

## 2023-12-05 RX ADMIN — METHYLPREDNISOLONE ACETATE 40 MG: 40 INJECTION, SUSPENSION INTRA-ARTICULAR; INTRALESIONAL; INTRAMUSCULAR; PARENTERAL; SOFT TISSUE at 11:40

## 2023-12-05 RX ADMIN — Medication 1.5 ML: at 11:40

## 2023-12-05 RX ADMIN — IOHEXOL 0.5 ML: 300 INJECTION, SOLUTION INTRAVENOUS at 11:40

## 2023-12-05 NOTE — DISCHARGE INSTR - LAB
Do not apply heat to any area that is numb. If you have discomfort or soreness at the injection site, you may apply ice today, 20 minutes on and 20 minutes off. Tomorrow you may use ice or warm, moist heat. Do not apply ice or heat directly to the skin. If you experience severe shortness of breath, go to the Emergency Room. You may have numbness for several hours from the local anesthetic. Please use caution and common sense, especially with weight-bearing activities. You may have an increase or change in the discomfort for 36-48 hours after your treatment. Apply ice and continue with any pain medicine you have been prescribed. Do not do anything strenuous today. You may shower, but no tub baths or hot tubs today. You may resume your normal activities tomorrow, but do not “overdo it”. Resume normal activities slowly when you are feeling better. If you experience redness, drainage or swelling at the injection site, or if you develop a fever above 100 degrees, please call [facility] at [phone] or go to the Emergency Room. Continue to take all routine medicines prescribed by your primary care physician unless otherwise instructed by our staff. Most blood thinners should be started again according to your regularly scheduled dosing. If you have any questions, please give our office a call. As no general anesthesia was used in today's procedure, you should not experience any side effects related to anesthesia. If you have a problem specifically related to your procedure, please call our office at [phone]. Problems not related to your procedure should be directed to your primary care physician.

## 2023-12-05 NOTE — H&P
History of Present Illness: The patient is a 59 y.o. female who presents with complaints of left buttock pain    Past Medical History:   Diagnosis Date    Chronic back pain     Hyperlipidemia     Hypertension     Type 2 diabetes mellitus ([address])        Past Surgical History:   Procedure Laterality Date    BREAST BIOPSY Left     benign    HYSTERECTOMY  2005    LAMINECTOMY  August 22, 2023         Current Outpatient Medications:     aspirin (ECOTRIN LOW STRENGTH) 81 mg EC tablet, Take 81 mg by mouth daily (Patient not taking: Reported on 10/27/2023), Disp: , Rfl:     clobetasol (TEMOVATE) 0.05 % cream, Apply to affected area twice a day for 30 days, then once a day for 30 days, then 1-2x/week, Disp: , Rfl:     Droplet Pen Needles 32G X 4 MM MISC, USE FOR INSULIN INJECTIONS ONCE DAILY (Patient not taking: Reported on 11/30/2023), Disp: , Rfl:     estradiol (ESTRACE) 0.1 mg/g vaginal cream, Insert applicator (1/4 full) into vagina twice weekly. , Disp: , Rfl:     famotidine (PEPCID) 20 mg/2 mL SOLN, , Disp: , Rfl:     glimepiride (AMARYL) 2 mg tablet, Take 1 tablet by mouth daily, Disp: , Rfl:     Insulin Glargine Solostar 100 UNIT/ML SOPN, inject 14 units subcutaneously once daily, Disp: , Rfl:     Insulin Glargine w/ Trans Port 100 UNIT/ML SOPN, Inject 14 Units under the skin daily, Disp: , Rfl:     lisinopril (ZESTRIL) 5 mg tablet, Take 10 mg by mouth daily, Disp: , Rfl:     loratadine (CLARITIN) 10 mg tablet, , Disp: , Rfl:     metaxalone (SKELAXIN) 800 mg tablet, Take 1 tablet (800 mg total) by mouth daily at bedtime as needed for muscle spasms, Disp: 90 tablet, Rfl: 0    methylPREDNISolone 4 MG tablet therapy pack, Use as directed on package (Patient not taking: Reported on 8/30/2023), Disp: 1 each, Rfl: 0    pregabalin (Lyrica) 100 mg capsule, Take 1 capsule (100 mg total) by mouth daily at bedtime Take 1 capsule with the 50mg capsule at bedtime, Disp: 90 capsule, Rfl: 1    simvastatin (ZOCOR) 20 mg tablet, Take 1 tablet by mouth every evening, Disp: , Rfl:     valACYclovir (VALTREX) 1,000 mg tablet, Take 1,000 mg by mouth Only takes when needed. , Disp: , Rfl:     Allergies   Allergen Reactions    Acetazolamide Hives    Red Dye - Food Allergy Other (See Comments)     unknown    Sulfa Antibiotics Other (See Comments)       Physical Exam:   Vitals:    12/05/23 1127   BP: 128/80   Pulse: 73   Resp: 18   Temp: (!) 97.3 °F (36.3 °C)   SpO2: 94%     General: Awake, Alert, Oriented x 3, Mood and affect appropriate  Respiratory: Respirations even and unlabored  Cardiovascular: Peripheral pulses intact; no edema  Musculoskeletal Exam: left buttock pain    ASA Score: 3    Patient/Chart Verification  Patient ID Verified: Verbal  ID Band Applied: No  Consents Confirmed: Procedural, To be obtained in the Pre-Procedure area  H&P( within 30 days) Verified: Yes  Interval H&P(within 24 hr) Complete (required for Outpatients and Surgery Admit only): Yes  Allergies Reviewed: Yes  Anticoag/NSAID held?: NA  Currently on antibiotics?: No  Pregnancy denied?: NA    Assessment:   1. Myofascial pain syndrome    2.  Piriformis syndrome, left        Plan: (L) piriformis injection

## 2023-12-07 DIAGNOSIS — G57.02 PIRIFORMIS SYNDROME, LEFT: ICD-10-CM

## 2023-12-07 DIAGNOSIS — M54.16 LUMBAR RADICULITIS: Primary | ICD-10-CM

## 2023-12-07 RX ORDER — PREGABALIN 50 MG/1
50 CAPSULE ORAL DAILY
Qty: 90 CAPSULE | Refills: 0 | Status: SHIPPED | OUTPATIENT
Start: 2023-12-07

## 2023-12-12 ENCOUNTER — TELEPHONE (OUTPATIENT)
Dept: PAIN MEDICINE | Facility: CLINIC | Age: 63
End: 2023-12-12

## 2023-12-12 NOTE — TELEPHONE ENCOUNTER
Caller: pt    Doctor: Mindi Chamberlain    Reason for call: pt wanted to let you know she is not in bed anymore, she is able to walk a little bit.     Call back#: [phone]

## 2023-12-12 NOTE — TELEPHONE ENCOUNTER
Trent Ludwig RN s/w pt in depth regarding progress. Per pt she still has the pain and it still makes her have to sit down or bend over after being up for a few minutes or when walking for a few minutes. Pt is however out of bed so this is some progress. Pt is aware that the steroid injections can take up to 2 weeks for full pain effect to be achieved. Pt is continuing to take 50mg of Lyrica in AM and 100mg at HS  She has Skelaxone 800mg at hs and has taken one during the day as well and is not really sure if this is helping. Continues to take ibuprofen or naproxen as well and is aware that she may also alternate with 1000mg of tylenol up to TID not to go over 3000mg in a day. Pt is willing to give this another week if Trent Ludwig feels that this would be beneficial.  Pt has a steroid dose randi back from June when [facility] ordered this but said not to take it unless absolutely necessary. RN advised that she did just have steroids injected and would think the dose randi does nto need to be started at this time. Pt is going away this Saturday and is wondering if there is any other suggestions to help with the pain. --please advise thank you--  Different muscle relaxer? Pt stated that this was the one that she has been on since her surgery but doesn't feel that it is doing anything. Take her unused dose randi with her on her vacation in case she needs it? Reach out to her surgeon from [facility] regarding ongoing back pain?

## 2023-12-12 NOTE — TELEPHONE ENCOUNTER
Patient reports 0% improvement post inj  Unable to give Pain level /10, when it flares its extreme. Patient wants to know what is the right medication to get pain relief. Can she use a medrol pack?

## 2023-12-12 NOTE — TELEPHONE ENCOUNTER
Pt appreciative of information and will bring dose randi with her on the trip. Per pt she isn't asking to change the skelaxin to another medication but would she be able to take in more that just at HS?

## 2023-12-12 NOTE — TELEPHONE ENCOUNTER
Pt aware and appreciative of same. Aware to take and not drive until knows how she feels when taking the medication. Pt verbalized understanding of same.

## 2024-03-05 DIAGNOSIS — M96.1 POSTLAMINECTOMY SYNDROME OF LUMBAR REGION: ICD-10-CM

## 2024-03-05 DIAGNOSIS — G57.02 PIRIFORMIS SYNDROME, LEFT: ICD-10-CM

## 2024-03-05 DIAGNOSIS — M54.16 LUMBAR RADICULITIS: ICD-10-CM

## 2024-03-05 RX ORDER — PREGABALIN 50 MG/1
50 CAPSULE ORAL DAILY
Qty: 90 CAPSULE | Refills: 0 | Status: SHIPPED | OUTPATIENT
Start: 2024-03-05

## 2024-03-05 RX ORDER — PREGABALIN 100 MG/1
100 CAPSULE ORAL
Qty: 90 CAPSULE | Refills: 0 | Status: SHIPPED | OUTPATIENT
Start: 2024-03-05

## 2024-03-05 NOTE — TELEPHONE ENCOUNTER
S/W pt.  She is requesting refill of Lyrica 50 mg in am and 100 mg at hs.  She only has one pill left of 100 mg.  She stated it helps her and denies side effects.  She stated she is getting back surgery on 4/29.  Please advise.

## 2024-03-05 NOTE — TELEPHONE ENCOUNTER
Reason for call:   [x] Refill   [] Prior Auth  [x] Other: Patient would like a call when this medication is approved, she only has one pill one the 100mg left. Due to e-prescribing errors last week the pharmacy never got these refills.    Office:   [] PCP/Provider -   [x] Specialty/Provider - Spine and Pain    Medication:   pregabalin (Lyrica) 100 mg capsule  Take 1 capsule (100 mg total) by mouth daily at bedtime Take 1 capsule with the 50mg capsule at bedtime  #90    pregabalin (LYRICA) 50 mg capsule  Take 1 capsule (50 mg total) by mouth daily #90    Pharm[id number] [address] [phone]    Does the patient have enough for 3 days?   [] Yes   [x] No - Send as HP to POD

## 2024-03-07 NOTE — TELEPHONE ENCOUNTER
Patients spouse called Rx refill line checking status of PA for patient. Advised we are waiting for determination from the insurance company. He verbalized understanding.